# Patient Record
Sex: FEMALE | Race: OTHER | HISPANIC OR LATINO | Employment: UNEMPLOYED | URBAN - METROPOLITAN AREA
[De-identification: names, ages, dates, MRNs, and addresses within clinical notes are randomized per-mention and may not be internally consistent; named-entity substitution may affect disease eponyms.]

---

## 2020-11-27 ENCOUNTER — APPOINTMENT (EMERGENCY)
Dept: CT IMAGING | Facility: HOSPITAL | Age: 55
End: 2020-11-27

## 2020-11-27 ENCOUNTER — HOSPITAL ENCOUNTER (EMERGENCY)
Facility: HOSPITAL | Age: 55
Discharge: HOME | End: 2020-11-27
Attending: EMERGENCY MEDICINE | Admitting: EMERGENCY MEDICINE

## 2020-11-27 VITALS
RESPIRATION RATE: 20 BRPM | TEMPERATURE: 97 F | OXYGEN SATURATION: 100 % | DIASTOLIC BLOOD PRESSURE: 96 MMHG | WEIGHT: 196.2 LBS | HEART RATE: 69 BPM | SYSTOLIC BLOOD PRESSURE: 159 MMHG

## 2020-11-27 DIAGNOSIS — I10 CHRONIC HYPERTENSION: ICD-10-CM

## 2020-11-27 DIAGNOSIS — R51.9 HEADACHE: Primary | ICD-10-CM

## 2020-11-27 LAB
ALBUMIN SERPL BCP-MCNC: 4 G/DL (ref 3–5.2)
ALP SERPL-CCNC: 69 U/L (ref 43–122)
ALT SERPL W P-5'-P-CCNC: 7 U/L (ref 9–52)
ANION GAP SERPL CALCULATED.3IONS-SCNC: 4 MMOL/L (ref 5–14)
AST SERPL W P-5'-P-CCNC: 24 U/L (ref 14–36)
ATRIAL RATE: 72 BPM
BASOPHILS # BLD AUTO: 0.09 THOUSAND/UL (ref 0–0.1)
BASOPHILS NFR MAR MANUAL: 1 % (ref 0–1)
BILIRUB SERPL-MCNC: 0.6 MG/DL
BUN SERPL-MCNC: 9 MG/DL (ref 5–25)
CALCIUM SERPL-MCNC: 9.1 MG/DL (ref 8.4–10.2)
CHLORIDE SERPL-SCNC: 107 MMOL/L (ref 97–108)
CO2 SERPL-SCNC: 25 MMOL/L (ref 22–30)
CREAT SERPL-MCNC: 0.75 MG/DL (ref 0.6–1.2)
EOSINOPHIL # BLD AUTO: 0.09 THOUSAND/UL (ref 0–0.4)
EOSINOPHIL NFR BLD MANUAL: 1 % (ref 0–6)
ERYTHROCYTE [DISTWIDTH] IN BLOOD BY AUTOMATED COUNT: 14 %
GFR SERPL CREATININE-BSD FRML MDRD: 91 ML/MIN/1.73SQ M
GLUCOSE SERPL-MCNC: 136 MG/DL (ref 70–99)
HCT VFR BLD AUTO: 42.4 % (ref 36–46)
HGB BLD-MCNC: 14.6 G/DL (ref 12–16)
LYMPHOCYTES # BLD AUTO: 3.68 THOUSAND/UL (ref 0.5–4)
LYMPHOCYTES # BLD AUTO: 40 % (ref 25–45)
MCH RBC QN AUTO: 31.8 PG (ref 26–34)
MCHC RBC AUTO-ENTMCNC: 34.4 G/DL (ref 31–36)
MCV RBC AUTO: 92 FL (ref 80–100)
MONOCYTES # BLD AUTO: 0.28 THOUSAND/UL (ref 0.2–0.9)
MONOCYTES NFR BLD AUTO: 3 % (ref 1–10)
NEUTS SEG # BLD: 4.69 THOUSAND/UL (ref 1.8–7.8)
NEUTS SEG NFR BLD AUTO: 51 %
P AXIS: 63 DEGREES
PLATELET # BLD AUTO: 246 THOUSANDS/UL (ref 150–450)
PLATELET BLD QL SMEAR: ADEQUATE
PMV BLD AUTO: 9.9 FL (ref 8.9–12.7)
POTASSIUM SERPL-SCNC: 3.8 MMOL/L (ref 3.6–5)
PR INTERVAL: 186 MS
PROT SERPL-MCNC: 7.4 G/DL (ref 5.9–8.4)
QRS AXIS: 33 DEGREES
QRSD INTERVAL: 88 MS
QT INTERVAL: 406 MS
QTC INTERVAL: 444 MS
RBC # BLD AUTO: 4.58 MILLION/UL (ref 4–5.2)
RBC MORPH BLD: NORMAL
SODIUM SERPL-SCNC: 136 MMOL/L (ref 137–147)
T WAVE AXIS: 31 DEGREES
TOTAL CELLS COUNTED SPEC: 100
VARIANT LYMPHS # BLD AUTO: 4 % (ref 0–0)
VENTRICULAR RATE: 72 BPM
WBC # BLD AUTO: 9.2 THOUSAND/UL (ref 4.5–11)

## 2020-11-27 PROCEDURE — 85027 COMPLETE CBC AUTOMATED: CPT | Performed by: EMERGENCY MEDICINE

## 2020-11-27 PROCEDURE — 96360 HYDRATION IV INFUSION INIT: CPT

## 2020-11-27 PROCEDURE — 85007 BL SMEAR W/DIFF WBC COUNT: CPT | Performed by: EMERGENCY MEDICINE

## 2020-11-27 PROCEDURE — 99285 EMERGENCY DEPT VISIT HI MDM: CPT | Performed by: EMERGENCY MEDICINE

## 2020-11-27 PROCEDURE — 93005 ELECTROCARDIOGRAM TRACING: CPT

## 2020-11-27 PROCEDURE — 80053 COMPREHEN METABOLIC PANEL: CPT | Performed by: EMERGENCY MEDICINE

## 2020-11-27 PROCEDURE — G1004 CDSM NDSC: HCPCS

## 2020-11-27 PROCEDURE — 36415 COLL VENOUS BLD VENIPUNCTURE: CPT | Performed by: EMERGENCY MEDICINE

## 2020-11-27 PROCEDURE — 99284 EMERGENCY DEPT VISIT MOD MDM: CPT

## 2020-11-27 PROCEDURE — 93010 ELECTROCARDIOGRAM REPORT: CPT | Performed by: INTERNAL MEDICINE

## 2020-11-27 PROCEDURE — 96361 HYDRATE IV INFUSION ADD-ON: CPT

## 2020-11-27 PROCEDURE — 70450 CT HEAD/BRAIN W/O DYE: CPT

## 2020-11-27 RX ADMIN — SODIUM CHLORIDE 1000 ML: 0.9 INJECTION, SOLUTION INTRAVENOUS at 08:18

## 2021-04-15 ENCOUNTER — HOSPITAL ENCOUNTER (EMERGENCY)
Facility: HOSPITAL | Age: 56
Discharge: HOME | End: 2021-04-15
Attending: EMERGENCY MEDICINE | Admitting: EMERGENCY MEDICINE

## 2021-04-15 VITALS
WEIGHT: 196.21 LBS | OXYGEN SATURATION: 99 % | DIASTOLIC BLOOD PRESSURE: 73 MMHG | TEMPERATURE: 95.6 F | RESPIRATION RATE: 18 BRPM | HEART RATE: 74 BPM | SYSTOLIC BLOOD PRESSURE: 138 MMHG

## 2021-04-15 DIAGNOSIS — F10.929 ALCOHOL INTOXICATION (HCC): Primary | ICD-10-CM

## 2021-04-15 LAB
ALBUMIN SERPL BCP-MCNC: 4.9 G/DL (ref 3–5.2)
ALP SERPL-CCNC: 89 U/L (ref 43–122)
ALT SERPL W P-5'-P-CCNC: 28 U/L
AMPHETAMINES SERPL QL SCN: NEGATIVE
ANION GAP SERPL CALCULATED.3IONS-SCNC: 14 MMOL/L (ref 5–14)
AST SERPL W P-5'-P-CCNC: 54 U/L (ref 14–36)
ATRIAL RATE: 78 BPM
BARBITURATES UR QL: NEGATIVE
BENZODIAZ UR QL: NEGATIVE
BILIRUB SERPL-MCNC: 1.62 MG/DL
BUN SERPL-MCNC: 4 MG/DL (ref 5–25)
CALCIUM SERPL-MCNC: 10.2 MG/DL (ref 8.4–10.2)
CHLORIDE SERPL-SCNC: 110 MMOL/L (ref 97–108)
CO2 SERPL-SCNC: 20 MMOL/L (ref 22–30)
COCAINE UR QL: NEGATIVE
CREAT SERPL-MCNC: 0.59 MG/DL (ref 0.6–1.2)
ERYTHROCYTE [DISTWIDTH] IN BLOOD BY AUTOMATED COUNT: 14.7 %
ETHANOL EXG-MCNC: 0.1 MG/DL
ETHANOL EXG-MCNC: 0.22 MG/DL
FLUAV RNA RESP QL NAA+PROBE: NEGATIVE
FLUBV RNA RESP QL NAA+PROBE: NEGATIVE
GFR SERPL CREATININE-BSD FRML MDRD: 104 ML/MIN/1.73SQ M
GLUCOSE SERPL-MCNC: 199 MG/DL (ref 70–99)
HCT VFR BLD AUTO: 43.5 % (ref 36–46)
HGB BLD-MCNC: 14.6 G/DL (ref 12–16)
LYMPHOCYTES # BLD AUTO: 3.91 THOUSAND/UL (ref 0.5–4)
LYMPHOCYTES # BLD AUTO: 43 % (ref 25–45)
MCH RBC QN AUTO: 31.2 PG (ref 26–34)
MCHC RBC AUTO-ENTMCNC: 33.7 G/DL (ref 31–36)
MCV RBC AUTO: 93 FL (ref 80–100)
METHADONE UR QL: NEGATIVE
MONOCYTES # BLD AUTO: 0.36 THOUSAND/UL (ref 0.2–0.9)
MONOCYTES NFR BLD AUTO: 4 % (ref 1–10)
NEUTS SEG # BLD: 4.82 THOUSAND/UL (ref 1.8–7.8)
NEUTS SEG NFR BLD AUTO: 53 %
OPIATES UR QL SCN: NEGATIVE
OXYCODONE+OXYMORPHONE UR QL SCN: NEGATIVE
P AXIS: 60 DEGREES
PCP UR QL: NEGATIVE
PLATELET # BLD AUTO: 294 THOUSANDS/UL (ref 150–450)
PLATELET BLD QL SMEAR: ADEQUATE
PMV BLD AUTO: 10.2 FL (ref 8.9–12.7)
POTASSIUM SERPL-SCNC: 5.4 MMOL/L (ref 3.6–5)
PR INTERVAL: 200 MS
PROT SERPL-MCNC: 9.1 G/DL (ref 5.9–8.4)
QRS AXIS: 47 DEGREES
QRSD INTERVAL: 94 MS
QT INTERVAL: 412 MS
QTC INTERVAL: 469 MS
RBC # BLD AUTO: 4.68 MILLION/UL (ref 4–5.2)
RBC MORPH BLD: NORMAL
RSV RNA RESP QL NAA+PROBE: NEGATIVE
SARS-COV-2 RNA RESP QL NAA+PROBE: NEGATIVE
SODIUM SERPL-SCNC: 144 MMOL/L (ref 137–147)
T WAVE AXIS: 61 DEGREES
THC UR QL: NEGATIVE
TOTAL CELLS COUNTED SPEC: 100
TSH SERPL DL<=0.05 MIU/L-ACNC: 0.68 UIU/ML (ref 0.47–4.68)
VENTRICULAR RATE: 78 BPM
WBC # BLD AUTO: 9.1 THOUSAND/UL (ref 4.5–11)

## 2021-04-15 PROCEDURE — 85027 COMPLETE CBC AUTOMATED: CPT | Performed by: PHYSICIAN ASSISTANT

## 2021-04-15 PROCEDURE — 0241U HB NFCT DS VIR RESP RNA 4 TRGT: CPT | Performed by: PHYSICIAN ASSISTANT

## 2021-04-15 PROCEDURE — 80307 DRUG TEST PRSMV CHEM ANLYZR: CPT | Performed by: PHYSICIAN ASSISTANT

## 2021-04-15 PROCEDURE — 82075 ASSAY OF BREATH ETHANOL: CPT | Performed by: EMERGENCY MEDICINE

## 2021-04-15 PROCEDURE — 99284 EMERGENCY DEPT VISIT MOD MDM: CPT | Performed by: PHYSICIAN ASSISTANT

## 2021-04-15 PROCEDURE — 84443 ASSAY THYROID STIM HORMONE: CPT | Performed by: PHYSICIAN ASSISTANT

## 2021-04-15 PROCEDURE — 85007 BL SMEAR W/DIFF WBC COUNT: CPT | Performed by: PHYSICIAN ASSISTANT

## 2021-04-15 PROCEDURE — 82075 ASSAY OF BREATH ETHANOL: CPT | Performed by: PHYSICIAN ASSISTANT

## 2021-04-15 PROCEDURE — 80053 COMPREHEN METABOLIC PANEL: CPT | Performed by: PHYSICIAN ASSISTANT

## 2021-04-15 PROCEDURE — 96372 THER/PROPH/DIAG INJ SC/IM: CPT

## 2021-04-15 PROCEDURE — 93005 ELECTROCARDIOGRAM TRACING: CPT

## 2021-04-15 PROCEDURE — 36415 COLL VENOUS BLD VENIPUNCTURE: CPT | Performed by: PHYSICIAN ASSISTANT

## 2021-04-15 PROCEDURE — 93010 ELECTROCARDIOGRAM REPORT: CPT

## 2021-04-15 PROCEDURE — 99285 EMERGENCY DEPT VISIT HI MDM: CPT

## 2021-04-15 RX ORDER — BENZTROPINE MESYLATE 1 MG/ML
2 INJECTION INTRAMUSCULAR; INTRAVENOUS ONCE
Status: COMPLETED | OUTPATIENT
Start: 2021-04-15 | End: 2021-04-15

## 2021-04-15 RX ORDER — LORAZEPAM 2 MG/ML
2 INJECTION INTRAMUSCULAR ONCE
Status: COMPLETED | OUTPATIENT
Start: 2021-04-15 | End: 2021-04-15

## 2021-04-15 RX ORDER — HALOPERIDOL 5 MG/ML
INJECTION INTRAMUSCULAR
Status: COMPLETED
Start: 2021-04-15 | End: 2021-04-15

## 2021-04-15 RX ORDER — HALOPERIDOL 5 MG/ML
5 INJECTION INTRAMUSCULAR ONCE
Status: COMPLETED | OUTPATIENT
Start: 2021-04-15 | End: 2021-04-15

## 2021-04-15 RX ORDER — LORAZEPAM 2 MG/ML
INJECTION INTRAMUSCULAR
Status: COMPLETED
Start: 2021-04-15 | End: 2021-04-15

## 2021-04-15 RX ADMIN — HALOPERIDOL 5 MG: 5 INJECTION INTRAMUSCULAR at 04:42

## 2021-04-15 RX ADMIN — LORAZEPAM 2 MG: 2 INJECTION INTRAMUSCULAR; INTRAVENOUS at 04:42

## 2021-04-15 RX ADMIN — HALOPERIDOL LACTATE 5 MG: 5 INJECTION, SOLUTION INTRAMUSCULAR at 04:42

## 2021-04-15 RX ADMIN — BENZTROPINE MESYLATE 2 MG: 1 INJECTION, SOLUTION INTRAMUSCULAR; INTRAVENOUS at 04:42

## 2021-04-15 RX ADMIN — LORAZEPAM 2 MG: 2 INJECTION INTRAMUSCULAR at 04:42

## 2021-04-15 NOTE — Clinical Note
Gilmer Pizarroer should be transferred out to [facility] and has been medically cleared

## 2021-04-15 NOTE — ED NOTES
Pt received sleeping, respirations are even and unlabored    Continuous 1:1 sitter at bedside       Delilah Davalos RN  04/15/21 4164

## 2021-04-15 NOTE — ED NOTES
Patient sleeping comfortably  No s/s of respiratory distress noted  1 to 1 continued  [facility] ED Tech at bedside         Marium Schilling RN  04/15/21 9795

## 2021-04-15 NOTE — Clinical Note
Sushant Anna was seen and treated in our emergency department on 4/15/2021  Diagnosis:     [place]    She may return on this date: 04/18/2021         If you have any questions or concerns, please don't hesitate to call        Dulce Shepherd DO    ______________________________           _______________          _______________  Hospital Representative                              Date                                Time

## 2021-04-15 NOTE — ED NOTES
PT provided safety tray lunch  PT stated she is not hungry and will eat when she leaves        Melven Dust  04/15/21 1338

## 2021-04-15 NOTE — ED PROVIDER NOTES
History  Chief Complaint   Patient presents with    Psychiatric Evaluation     Patient presents via [employer] for suicidal ideation  Patient's 4 year death anniversary is apparently coming up and patient became very depressed  According to [employer], she was drinking alcohol at home and wants to die  "I called 911 because I wanted you to help me die"  She has no specific plan  Denies any HI or VH/ AH  She herself denies any alcohol use this morning  Also denying any drug use  She does have a history of suicide attempt, stating the most recent episode was in 2013  She denies any other symptoms or complaints  None       Past Medical History:   Diagnosis Date    Diabetes mellitus ([address] )     Hypertension        History reviewed  No pertinent surgical history  History reviewed  No pertinent family history  I have reviewed and agree with the history as documented  E-Cigarette/Vaping    E-Cigarette Use Never User      E-Cigarette/Vaping Substances    Nicotine No     THC No     CBD No     Flavoring No     Other No     Unknown No      Social History     Tobacco Use    Smoking status: Current Every Day Smoker     Packs/day: 0 50     Types: Cigarettes    Smokeless tobacco: Never Used   Substance Use Topics    Alcohol use: Yes     Comment: socially    Drug use: Never       Review of Systems   Constitutional: Negative for chills and fever  HENT: Negative for congestion, ear pain and sore throat  Eyes: Negative for pain  Respiratory: Negative for cough and shortness of breath  Cardiovascular: Negative for chest pain  Gastrointestinal: Negative for abdominal pain, nausea and vomiting  Genitourinary: Negative for dysuria  Musculoskeletal: Negative for back pain  Skin: Negative for rash  Neurological: Negative for dizziness and numbness  Psychiatric/Behavioral: Positive for suicidal ideas  All other systems reviewed and are negative  Physical Exam  Physical Exam  Vitals signs reviewed  Constitutional:       Appearance: Normal appearance  She is well-developed  She is not ill-appearing  Comments: tearful   HENT:      Head: Normocephalic and atraumatic  Right Ear: External ear normal       Left Ear: External ear normal       Nose: Nose normal       Mouth/Throat:      Mouth: Mucous membranes are moist       Pharynx: Oropharynx is clear  Neck:      Musculoskeletal: Normal range of motion and neck supple  Cardiovascular:      Rate and Rhythm: Normal rate and regular rhythm  Heart sounds: Normal heart sounds  Pulmonary:      Effort: Pulmonary effort is normal       Breath sounds: Normal breath sounds  Abdominal:      General: Bowel sounds are normal       Palpations: Abdomen is soft  Tenderness: There is no abdominal tenderness  Musculoskeletal: Normal range of motion  Skin:     General: Skin is warm and dry  Capillary Refill: Capillary refill takes less than 2 seconds  Neurological:      Mental Status: She is alert and oriented to person, place, and time  Psychiatric:         Attention and Perception: Attention and perception normal  She does not perceive auditory or visual hallucinations  Mood and Affect: Mood is depressed  Affect is labile, tearful and inappropriate  Speech: Speech normal          Behavior: Behavior is agitated  Thought Content: Thought content includes suicidal ideation  Thought content does not include homicidal ideation  Thought content does not include homicidal or suicidal plan  Judgment: Judgment is inappropriate           Vital Signs  ED Triage Vitals [04/15/21 0417]   Temperature Pulse Respirations Blood Pressure SpO2   (!) 97 °F (36 1 °C) 96 18 (!) 166/110 98 %      Temp Source Heart Rate Source Patient Position - Orthostatic VS BP Location FiO2 (%)   Tympanic Left;Radial Sitting Left arm --      Pain Score       --           Vitals:    04/15/21 0417   BP: (!) 166/110   Pulse: 96   Patient Position - Orthostatic VS: Sitting         Visual Acuity      ED Medications  Medications   haloperidol lactate (HALDOL) injection 5 mg (5 mg Intramuscular Given 4/15/21 0442)   LORazepam (ATIVAN) injection 2 mg (2 mg Intramuscular Given 4/15/21 0442)   benztropine (COGENTIN) injection 2 mg (2 mg Intramuscular Given 4/15/21 0442)       Diagnostic Studies  Results Reviewed     Procedure Component Value Units Date/Time    Rapid drug screen, urine [[id number]]  (Normal) Collected: 04/15/21 0518    Lab Status: Final result Specimen: Urine, Clean Catch Updated: 04/15/21 0542     Amph/Meth UR Negative     Barbiturate Ur Negative     Benzodiazepine Urine Negative     Cocaine Urine Negative     Methadone Urine Negative     Opiate Urine Negative     PCP Ur Negative     THC Urine Negative     Oxycodone Urine Negative    Narrative:      FOR MEDICAL PURPOSES ONLY  IF CONFIRMATION NEEDED PLEASE CONTACT THE LAB WITHIN 5 DAYS  Drug Screen Cutoff Levels:  AMPHETAMINE/METHAMPHETAMINES  1000 ng/mL  BARBITURATES     200 ng/mL  BENZODIAZEPINES     200 ng/mL  COCAINE      300 ng/mL  METHADONE      300 ng/mL  OPIATES      300 ng/mL  PHENCYCLIDINE     25 ng/mL  THC       50 ng/mL  OXYCODONE      100 ng/mL    TSH [[id number]]  (Normal) Collected: 04/15/21 0434    Lab Status: Final result Specimen: Blood from Arm, Right Updated: 04/15/21 0526     TSH 3RD GENERATON 0 682 uIU/mL     Narrative:      Patients undergoing fluorescein dye angiography may retain small amounts of fluorescein in the body for 48-72 hours post procedure  Samples containing fluorescein can produce falsely depressed TSH values  If the patient had this procedure,a specimen should be resubmitted post fluorescein clearance        Manual Differential (Non Wam) [[id number]] Collected: 04/15/21 0434    Lab Status: Final result Specimen: Blood from Arm, Right Updated: 04/15/21 0511     Segmented % 53 %      Lymphocytes % 43 %      Monocytes % 4 %      Neutrophils Absolute 4 82 Thousand/uL      Lymphocytes Absolute 3 91 Thousand/uL      Monocytes Absolute 0 36 Thousand/uL      Total Counted 100     RBC Morphology Normal     Platelet Estimate Adequate    Comprehensive metabolic panel [[id number]]  (Abnormal) Collected: 04/15/21 0434    Lab Status: Final result Specimen: Blood from Arm, Right Updated: 04/15/21 0455     Sodium 144 mmol/L      Potassium 5 4 mmol/L      Chloride 110 mmol/L      CO2 20 mmol/L      ANION GAP 14 mmol/L      BUN 4 mg/dL      Creatinine 0 59 mg/dL      Glucose 199 mg/dL      Calcium 10 2 mg/dL      AST 54 U/L      ALT 28 U/L      Alkaline Phosphatase 89 U/L      Total Protein 9 1 g/dL      Albumin 4 9 g/dL      Total Bilirubin 1 62 mg/dL      eGFR 104 ml/min/1 73sq m     Narrative:      Gross Hemolysis  National Kidney Disease Foundation guidelines for Chronic Kidney Disease (CKD):     Stage 1 with normal or high GFR (GFR > 90 mL/min/1 73 square meters)    Stage 2 Mild CKD (GFR = 60-89 mL/min/1 73 square meters)    Stage 3A Moderate CKD (GFR = 45-59 mL/min/1 73 square meters)    Stage 3B Moderate CKD (GFR = 30-44 mL/min/1 73 square meters)    Stage 4 Severe CKD (GFR = 15-29 mL/min/1 73 square meters)    Stage 5 End Stage CKD (GFR <15 mL/min/1 73 square meters)  Note: GFR calculation is accurate only with a steady state creatinine    CBC and differential [[id number]]  (Normal) Collected: 04/15/21 0434    Lab Status: Final result Specimen: Blood from Arm, Right Updated: 04/15/21 0454     WBC 9 10 Thousand/uL      RBC 4 68 Million/uL      Hemoglobin 14 6 g/dL      Hematocrit 43 5 %      MCV 93 fL      MCH 31 2 pg      MCHC 33 7 g/dL      RDW 14 7 %      MPV 10 2 fL      Platelets 971 Thousands/uL     POCT alcohol breath test [[id number]]  (Normal) Resulted: 04/15/21 0434    Lab Status: Final result Updated: 04/15/21 0434     EXTBreath Alcohol 0 22    COVID[facility]/B[facility] [[id number]]     Lab Status: No result Specimen: Nares from Nasopharyngeal Swab No orders to display              Procedures  Procedures         ED Course  ED Course as of Apr 15 0652   u Apr 15, 2021   1053   Patient became suddenly very uncooperative trying to elope and uncooperative  She is intoxicated and wants to kill herself  Decision made to restrain patient in 4 points  9200 Patient now out of restraints      53-29-14-27 Care transferred to Dr GALILEO KENNEDY pending sobriety, crisis eval                                              MDM    Disposition  Final diagnoses:   Alcohol intoxication ([address] )     Time reflects when diagnosis was documented in both MDM as applicable and the Disposition within this note     Time User Action Codes Description Comment    4/15/2021  4:53 AM Devi Bowens Add [F10 649] Alcohol intoxication [facility])       ED Disposition     ED Disposition Condition Date/Time Comment    Transfer to [address] [place] [address] Apr 15, 2021  4:53 AM Jerome Valdovinos should be transferred out to [facility] and has been medically cleared  Follow-up Information    None         Patient's Medications    No medications on file     No discharge procedures on file      PDMP Review     None          ED Provider  Electronically Signed by           Rayne Horne PA-C  04/15/21 7929

## 2021-04-15 NOTE — ED NOTES
The patient arrived by Kanu LEUNG after she called 911  Patient was at home alone and began consuming alcohol as she has had trouble sleeping for 2 nights and hoped this would help  She became intoxicated and then depressed and passively suicidal  Police reported she asked them to help her die  She was tearful, irritable and agitated upon arrival  Patient stated it was the 4-year anniversary of the death of her  of 40 years and this was causing her to feel like she would rather be dead  She was seen by ED Crisis once medically cleared and sober  At the time of assessment, she stated she was feeling better and was no longer having suicidal thoughts  She stated she occasionally experiences such thoughts when thinking about her , but they are typically fleeting  She stated her relationship with her daughter is very good and she would never attempt suicide as it would hurt her daughter  The patient stated she had one attempt many years ago, which she recalled was an overdose although she does not recall the circumstances at the time  The patient reported she and her daughter usually talk when either of them feel down or upset, however, her daughter was away last night and she was alone  She stated she does not consume alcohol on a regular basis -perhaps once every 2 weeks  She denies HI  She denies hallucinations and there is not evidence of delusional thoughts  Patient stated she is generally content with her life  Her work atmosphere is enjoyable and her homelife is busy and generally happy  She stated she did not want resources for counseling or psychiatry  She stated she does not have a PCP  She is uninsured and will be provided with PATH information to assist with access to services and supports in the community

## 2021-04-15 NOTE — ED CARE HANDOFF
Emergency Department Sign Out Note        Sign out and transfer of care from [facility]rios  See Separate Emergency Department note  The patient, Cam Aceves, was evaluated by the previous provider for ETOH use, Psych Eval     Workup Completed:  Medical Eval    ED Course / Workup Pending (followup): The patient was seen by previous provider for alcohol intoxication and suicidal ideations  Patient found to be intoxicated with that alcohol level of 220 on arrival   Plan is for repeat alcohol and then patient can be medically cleared  Then to be evaluated by crisis in psychiatry  ED Course as of Apr 15 0910   Thu Apr 15, 2021   5245 Procedure Note: EKG  Date/Time: 04/15/21 7:28 AM   Performed by: [employer] N[employer]  Authorized by: Raynold Nose  ECG interpreted by me, the ED Provider: yes   The EKG demonstrates:  Rate 78  Rhythm sinus  QTc 469  No ST elevations/depressions            Procedures  MDM    Disposition  Final diagnoses:   Alcohol intoxication ([address] )     Time reflects when diagnosis was documented in both MDM as applicable and the Disposition within this note     Time User Action Codes Description Comment    4/15/2021  4:53 AM Wan Bowens Add [F10 929] Alcohol intoxication [facility])       ED Disposition     ED Disposition Condition Date/Time Comment    Transfer to [address] [place] [address] Apr 15, 2021  4:53 AM CarrollFavio Gomes should be transferred out to [address] [place] [address] and has been medically cleared  Follow-up Information    None       Patient's Medications    No medications on file     No discharge procedures on file         ED Provider  Electronically Signed by     Merrill Wheeler DO  04/15/21 8718

## 2021-04-15 NOTE — ED NOTES
Pt sleeping most of the morning, no appetite, pleasant and cooperative    Ready to go home         Braulio Sánchez RN  04/15/21 0847

## 2021-04-15 NOTE — ED NOTES
Patient sleeping comfortably  No s/s of respiratory distress noted  1 to 1 continued  [facility] ED Tech at bedside        Sabrina Gonzales RN  04/15/21 2350

## 2021-04-15 NOTE — ED NOTES
Patient laying comfortably  Current SI due to husbands death anniversary coming up  No plan  Last suicide attempt in 2013  Denies HI/AH/VH  No s/s of respiratory distress noted  1 to 1 initiated  Chen Negro at bedside        Valentino Renee RN  04/15/21 3470

## 2021-04-15 NOTE — RESTRAINT FACE TO FACE
Restraint Face to Face   Ramona Marcianne Lesches 48 y o  female MRN: [id number]  Unit/Bed#: ED 04 Encounter: [id number]      Physical Evaluation  AOx3, nonfocal, protecting airway, refusing to cooperate with staff  Purpose for Restraints/ Seclusion High risk for self harm, High risk for causing significant disruption of treatment environment , High risk for harm to others and high risk for flight  Patient's reaction to the intervention: crying, agitated, aggressive with staff   Offered multiple attempts at  deescalation by myself and other staff members  Patient's medical condition alcohol intoxication  Patient's Behavioral condition SI, depression  Restraints to be Continued

## 2021-05-02 ENCOUNTER — HOSPITAL ENCOUNTER (EMERGENCY)
Facility: HOSPITAL | Age: 56
Discharge: HOME | End: 2021-05-02
Attending: EMERGENCY MEDICINE | Admitting: EMERGENCY MEDICINE

## 2021-05-02 VITALS
OXYGEN SATURATION: 99 % | HEART RATE: 73 BPM | SYSTOLIC BLOOD PRESSURE: 168 MMHG | RESPIRATION RATE: 19 BRPM | TEMPERATURE: 97.6 F | DIASTOLIC BLOOD PRESSURE: 98 MMHG | WEIGHT: 194.45 LBS

## 2021-05-02 DIAGNOSIS — M54.42 ACUTE MIDLINE LOW BACK PAIN WITH LEFT-SIDED SCIATICA: Primary | ICD-10-CM

## 2021-05-02 PROCEDURE — 96372 THER/PROPH/DIAG INJ SC/IM: CPT

## 2021-05-02 PROCEDURE — 99283 EMERGENCY DEPT VISIT LOW MDM: CPT

## 2021-05-02 PROCEDURE — 99284 EMERGENCY DEPT VISIT MOD MDM: CPT | Performed by: EMERGENCY MEDICINE

## 2021-05-02 RX ORDER — METHYLPREDNISOLONE 4 MG/1
TABLET ORAL
Qty: 21 TABLET | Refills: 0 | Status: SHIPPED | OUTPATIENT
Start: 2021-05-02

## 2021-05-02 RX ORDER — KETOROLAC TROMETHAMINE 30 MG/ML
30 INJECTION, SOLUTION INTRAMUSCULAR; INTRAVENOUS ONCE
Status: COMPLETED | OUTPATIENT
Start: 2021-05-02 | End: 2021-05-02

## 2021-05-02 RX ORDER — CYCLOBENZAPRINE HCL 10 MG
10 TABLET ORAL 2 TIMES DAILY PRN
Qty: 20 TABLET | Refills: 0 | Status: SHIPPED | OUTPATIENT
Start: 2021-05-02

## 2021-05-02 RX ORDER — DIAZEPAM 5 MG/1
5 TABLET ORAL ONCE
Status: COMPLETED | OUTPATIENT
Start: 2021-05-02 | End: 2021-05-02

## 2021-05-02 RX ORDER — NAPROXEN 500 MG/1
500 TABLET ORAL 2 TIMES DAILY WITH MEALS
Qty: 30 TABLET | Refills: 0 | Status: SHIPPED | OUTPATIENT
Start: 2021-05-02

## 2021-05-02 RX ADMIN — KETOROLAC TROMETHAMINE 30 MG: 30 INJECTION, SOLUTION INTRAMUSCULAR; INTRAVENOUS at 10:01

## 2021-05-02 RX ADMIN — DIAZEPAM 5 MG: 5 TABLET ORAL at 10:01

## 2021-05-02 NOTE — ED PROVIDER NOTES
History  Chief Complaint   Patient presents with    Back Pain     Lower back pain which radiates down LLE  Since friday  47 yo female with a history of DM and HTN presents to the ED complaining of low back pain since Friday  The patient thinks she injured herself while lifting heavy boxes at work on Friday  She reports a constant "aching" pain in her lumbar back that occasionally radiates down her posterior left leg  No numbness or weakness  No incontinence/retention  She denies fevers/chills  No direct trauma to the back or falls  No history of IVDA  No dysuria/hematuria  No other specific complaints  None       Past Medical History:   Diagnosis Date    Diabetes mellitus ([facility] [address] [address] )     Hypertension        History reviewed  No pertinent surgical history  History reviewed  No pertinent family history  I have reviewed and agree with the history as documented  E-Cigarette/Vaping    E-Cigarette Use Never User      E-Cigarette/Vaping Substances    Nicotine No     THC No     CBD No     Flavoring No     Other No     Unknown No      Social History     Tobacco Use    Smoking status: Current Every Day Smoker     Packs/day: 0 50     Types: Cigarettes    Smokeless tobacco: Never Used   Substance Use Topics    Alcohol use: Yes     Comment: socially    Drug use: Never       Review of Systems   Constitutional: Negative for chills and fever  HENT: Negative for sore throat  Eyes: Negative for visual disturbance  Respiratory: Negative for shortness of breath  Cardiovascular: Negative for chest pain  Gastrointestinal: Negative for abdominal pain, diarrhea and vomiting  Endocrine: Negative for cold intolerance and heat intolerance  Genitourinary: Negative for difficulty urinating, dysuria and frequency  Musculoskeletal: Positive for back pain  Negative for gait problem  Skin: Negative for rash  Allergic/Immunologic: Negative for immunocompromised state     Neurological: Negative for weakness and numbness  Hematological: Negative for adenopathy  Psychiatric/Behavioral: Negative for self-injury  Physical Exam  Physical Exam  Constitutional:       General: She is not in acute distress  Appearance: She is well-developed  HENT:      Head: Normocephalic and atraumatic  Eyes:      Pupils: Pupils are equal, round, and reactive to light  Neck:      Musculoskeletal: Normal range of motion and neck supple  Cardiovascular:      Rate and Rhythm: Normal rate and regular rhythm  Pulmonary:      Effort: Pulmonary effort is normal       Breath sounds: Normal breath sounds  Abdominal:      General: There is no distension  Palpations: Abdomen is soft  Tenderness: There is no abdominal tenderness  There is no right CVA tenderness or left CVA tenderness  Musculoskeletal:      Lumbar back: She exhibits decreased range of motion, tenderness, pain and spasm  She exhibits no bony tenderness, no swelling, no edema and no deformity  Skin:     General: Skin is warm and dry  Neurological:      Mental Status: She is alert and oriented to person, place, and time  Cranial Nerves: Cranial nerves are intact  Sensory: Sensation is intact  Motor: Motor function is intact  Coordination: Coordination is intact  Gait: Gait is intact  Deep Tendon Reflexes: Reflexes are normal and symmetric           Vital Signs  ED Triage Vitals [05/02/21 0948]   Temperature Pulse Respirations Blood Pressure SpO2   97 6 °F (36 4 °C) 73 19 168/98 99 %      Temp Source Heart Rate Source Patient Position - Orthostatic VS BP Location FiO2 (%)   Tympanic Monitor Lying Left arm --      Pain Score       Worst Possible Pain           Vitals:    05/02/21 0948   BP: 168/98   Pulse: 73   Patient Position - Orthostatic VS: Lying         Visual Acuity      ED Medications  Medications   diazepam (VALIUM) tablet 5 mg (5 mg Oral Given 5/2/21 1001)   ketorolac (TORADOL) injection 30 mg (30 mg Intramuscular Given 5/2/21 1001)       Diagnostic Studies  Results Reviewed     None                 No orders to display              Procedures  Procedures         ED Course                             SBIRT 22yo+      Most Recent Value   SBIRT (23 yo +)   In order to provide better care to our patients, we are screening all of our patients for alcohol and drug use  Would it be okay to ask you these screening questions? No Filed at: 05/02/2021 1014                    [facility]  Number of Diagnoses or Management Options  Acute midline low back pain with left-sided sciatica:   Diagnosis management comments: The patient is uncomfortable appearing with palpable paraspinal muscle spasm in the lumbar back  Neurologic examination in normal  Very low clinical suspicion for an acute cord injury  Plan for pain control with NSAIDs, a muscle relaxer prn, and a course of oral steroids  The patient was [facility] to both [facility] and [facility]  Plan for follow up with them later this week  She is agreeable to this plan  Strict return precautions provided  Patient Progress  Patient progress: stable      Disposition  Final diagnoses:   Acute midline low back pain with left-sided sciatica     Time reflects when diagnosis was documented in both MDM as applicable and the Disposition within this note     Time User Action Codes Description Comment    5/2/2021  9:55 AM Fernando Jimenez Add [M54 42] Acute midline low back pain with left-sided sciatica       ED Disposition     ED Disposition Condition Date/Time Comment    Discharge Stable Sun May 2, 2021  9:55 AM Renetta Matthews discharge to home/self care              Follow-up Information    None         Discharge Medication List as of 5/2/2021  9:57 AM      START taking these medications    Details   cyclobenzaprine (FLEXERIL) 10 mg tablet Take 1 tablet (10 mg total) by mouth 2 (two) times a day as needed for muscle spasms, Starting Sun 5/2/2021, Print methylPREDNISolone 4 MG tablet therapy pack Use as directed on package, Print      naproxen (NAPROSYN) 500 mg tablet Take 1 tablet (500 mg total) by mouth 2 (two) times a day with meals, Starting Sun 5/2/2021, Print               PDMP Review     None          ED Provider  Electronically Signed by           Rayo Farrell MD  05/02/21 7393

## 2021-05-02 NOTE — Clinical Note
Franki Rodriguez was seen and treated in our emergency department on 5/2/2021  Diagnosis:     [place]  may return to work on return date  She may return on this date: 05/04/2021         If you have any questions or concerns, please don't hesitate to call        Christiane Figueredo MD    ______________________________           _______________          _______________  Hospital Representative                              Date                                Time

## 2021-05-04 ENCOUNTER — TELEPHONE (OUTPATIENT)
Dept: PHYSICAL THERAPY | Facility: OTHER | Age: 56
End: 2021-05-04

## 2021-05-04 NOTE — TELEPHONE ENCOUNTER
Call placed to the patient per [facility] referral     Marcial Ennis states customer can not be reached at this time please try again later  This is the 1st attempt to reach the patient  Will defer per protocol

## 2021-05-07 ENCOUNTER — TELEPHONE (OUTPATIENT)
Dept: PHYSICAL THERAPY | Facility: OTHER | Age: 56
End: 2021-05-07

## 2021-05-07 NOTE — TELEPHONE ENCOUNTER
Call placed to the patient per [facility] referral     Voice message left for patient to call back  Phone number and hours of business provided  This the 2nd attempt to reach the patient  Will defer per protocol

## 2021-05-17 ENCOUNTER — TELEPHONE (OUTPATIENT)
Dept: PHYSICAL THERAPY | Facility: OTHER | Age: 56
End: 2021-05-17

## 2021-05-17 NOTE — TELEPHONE ENCOUNTER
Call placed to the patient per [facility] referral     Voice message left for patient to call back  Phone number and hours of business provided  This is the 3rd attempt to reach the patient  Referral closed

## 2021-07-25 ENCOUNTER — HOSPITAL ENCOUNTER (OUTPATIENT)
Dept: MRI IMAGING | Facility: HOSPITAL | Age: 56
Discharge: HOME | End: 2021-07-25

## 2021-07-25 DIAGNOSIS — M51.26 HNP (HERNIATED NUCLEUS PULPOSUS), LUMBAR: ICD-10-CM

## 2021-07-25 PROCEDURE — 72148 MRI LUMBAR SPINE W/O DYE: CPT

## 2022-09-26 ENCOUNTER — OFFICE VISIT (OUTPATIENT)
Dept: FAMILY MEDICINE CLINIC | Facility: CLINIC | Age: 57
End: 2022-09-26

## 2022-09-26 VITALS
OXYGEN SATURATION: 96 % | HEIGHT: 68 IN | RESPIRATION RATE: 16 BRPM | TEMPERATURE: 96.8 F | DIASTOLIC BLOOD PRESSURE: 100 MMHG | SYSTOLIC BLOOD PRESSURE: 196 MMHG | BODY MASS INDEX: 28.79 KG/M2 | WEIGHT: 190 LBS | HEART RATE: 84 BPM

## 2022-09-26 DIAGNOSIS — M79.671 RIGHT FOOT PAIN: ICD-10-CM

## 2022-09-26 DIAGNOSIS — E11.9 TYPE 2 DIABETES MELLITUS WITHOUT COMPLICATION, WITHOUT LONG-TERM CURRENT USE OF INSULIN (HCC): Primary | ICD-10-CM

## 2022-09-26 DIAGNOSIS — I25.10 CORONARY ARTERY DISEASE INVOLVING NATIVE HEART, UNSPECIFIED VESSEL OR LESION TYPE, UNSPECIFIED WHETHER ANGINA PRESENT: ICD-10-CM

## 2022-09-26 DIAGNOSIS — Z98.890 H/O FOOT SURGERY: ICD-10-CM

## 2022-09-26 DIAGNOSIS — K21.9 GASTROESOPHAGEAL REFLUX DISEASE WITHOUT ESOPHAGITIS: ICD-10-CM

## 2022-09-26 DIAGNOSIS — I10 PRIMARY HYPERTENSION: ICD-10-CM

## 2022-09-26 LAB — SL AMB POCT HEMOGLOBIN AIC: 6.8 (ref ?–6.5)

## 2022-09-26 PROCEDURE — 83036 HEMOGLOBIN GLYCOSYLATED A1C: CPT | Performed by: FAMILY MEDICINE

## 2022-09-26 PROCEDURE — 99214 OFFICE O/P EST MOD 30 MIN: CPT | Performed by: FAMILY MEDICINE

## 2022-09-26 RX ORDER — ATORVASTATIN CALCIUM 20 MG/1
20 TABLET, FILM COATED ORAL DAILY
Qty: 30 TABLET | Refills: 3 | Status: SHIPPED | OUTPATIENT
Start: 2022-09-26

## 2022-09-26 RX ORDER — CARVEDILOL 25 MG/1
25 TABLET ORAL
COMMUNITY

## 2022-09-26 RX ORDER — LISINOPRIL 20 MG/1
20 TABLET ORAL DAILY
Qty: 30 TABLET | Refills: 3 | Status: SHIPPED | OUTPATIENT
Start: 2022-09-26

## 2022-09-26 RX ORDER — OMEPRAZOLE 40 MG/1
40 CAPSULE, DELAYED RELEASE ORAL DAILY
Qty: 30 CAPSULE | Refills: 2 | Status: SHIPPED | OUTPATIENT
Start: 2022-09-26
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
## 2022-09-27 PROBLEM — I10 PRIMARY HYPERTENSION: Status: ACTIVE | Noted: 2022-09-27

## 2022-09-27 PROBLEM — E11.9 TYPE 2 DIABETES MELLITUS WITHOUT COMPLICATION, WITHOUT LONG-TERM CURRENT USE OF INSULIN (HCC): Status: ACTIVE | Noted: 2022-09-27

## 2022-09-27 PROBLEM — I25.10 CORONARY ARTERY DISEASE INVOLVING NATIVE HEART: Status: ACTIVE | Noted: 2022-09-27

## 2022-09-27 PROBLEM — K21.9 GASTROESOPHAGEAL REFLUX DISEASE WITHOUT ESOPHAGITIS: Status: ACTIVE | Noted: 2022-09-27

## 2022-09-27 PROBLEM — Z98.890 H/O FOOT SURGERY: Status: ACTIVE | Noted: 2022-09-27

## 2022-09-27 PROBLEM — M79.671 RIGHT FOOT PAIN: Status: ACTIVE | Noted: 2022-09-27

## 2022-09-27 NOTE — ASSESSMENT & PLAN NOTE
BP Readings from Last 3 Encounters:   09/26/22 (!) 196/100   05/02/21 168/98   04/15/21 138/73     Uncontrolled Hypertension  Did not take her medication today  Increase Carvedilol to 25 mg bid  Start Lisinopril 20 mg daily  Lab work sent  Monitor BP at home  Recheck in 4 weeks

## 2022-09-27 NOTE — ASSESSMENT & PLAN NOTE
Lab Results   Component Value Date    HGBA1C 6 8 (A) 09/26/2022       - Current medications:  Metformin 1000 mg daily  - Keep log of blood glucose readings  - Encouraged: Diabetic Diet, Regular exercise, Weight loss   - +Htn: starting an ACE today  - +Hld: starting a statin today  - Ophthalmology: pending  - DM Foot exam: pending  - Dentist: pending

## 2022-11-21 DIAGNOSIS — I10 PRIMARY HYPERTENSION: Primary | ICD-10-CM
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
## 2022-11-22 RX ORDER — CARVEDILOL 25 MG/1
25 TABLET ORAL 2 TIMES DAILY WITH MEALS
Qty: 60 TABLET | Refills: 2 | Status: SHIPPED | OUTPATIENT
Start: 2022-11-22

## 2023-01-24 ENCOUNTER — OFFICE VISIT (OUTPATIENT)
Dept: OBGYN CLINIC | Facility: CLINIC | Age: 58
End: 2023-01-24

## 2023-01-24 VITALS
WEIGHT: 188.6 LBS | DIASTOLIC BLOOD PRESSURE: 75 MMHG | HEART RATE: 82 BPM | SYSTOLIC BLOOD PRESSURE: 151 MMHG | BODY MASS INDEX: 28.68 KG/M2

## 2023-01-24 DIAGNOSIS — Z12.31 ENCOUNTER FOR SCREENING MAMMOGRAM FOR MALIGNANT NEOPLASM OF BREAST: ICD-10-CM

## 2023-01-24 DIAGNOSIS — Z01.419 ENCOUNTER FOR GYNECOLOGICAL EXAMINATION WITHOUT ABNORMAL FINDING: Primary | ICD-10-CM

## 2023-01-24 DIAGNOSIS — Z12.4 SCREENING FOR CERVICAL CANCER: ICD-10-CM

## 2023-01-24 DIAGNOSIS — Z12.39 ENCOUNTER FOR BREAST CANCER SCREENING USING NON-MAMMOGRAM MODALITY: ICD-10-CM

## 2023-01-24 DIAGNOSIS — Z12.11 SCREENING FOR COLON CANCER: ICD-10-CM

## 2023-01-24 NOTE — PATIENT INSTRUCTIONS
Alejandro por guerrero confianza en nuestro equipo  Areaa Overall y agradecemos arlene comentarios  Si recibe gabriela encuesta nuestra, tómese unos momentos para informarnos cómo estamos  Sinceramente,  [place] Friends, CRNP       Fumar cigarrillos y guerrero lemuel     Los riesgos para guerrero lemuel si usted fuma:  La nicotina y otros químicos que se encuentran en el tabaco dañan todas las células del cuerpo  Aunque fume poco o sea un fumador social, el riesgo de [address] , enfermedad del corazón y enfermedad de los pulmones [place]  Si usted está embarazada o tiene diabetes, el fumar aumenta guerrero riesgo de sufrir complicaciones  Los beneficios para guerrero lemuel si usted nadia de fumar:   Disminuyen los síntomas respiratorios cisco tos, sibilancias y dificultad para respirar  Usted reduce el riesgo de cáncer de pulmón, bucal, de la garganta, riñón, vejiga, páncreas, estómago y cervix  Si usted ya tiene cáncer, al no fumar aumenta los beneficios de la quimioterapia  También reduce guerrero riesgo de que el cáncer regrese o que desarrolle un karen cáncer  Usted reduce guerrero riesgo de gabriela enfermedad cardíaca, coágulos sanguíneos, ataque cardíaco y un derrame cerebral      Usted reduce guerrero riesgo de presentar infecciones y enfermedades en los pulmones, cisco la neumonía, el asma, la bronquitis crónica y [place]  Guerrero circulación mejora  Al permitir que [employer] suministro de oxígeno a guerrero cuerpo  Si usted tiene diabetes, disminuye guerrero riesgo de complicaciones, cisco enfermedades del [place], de las arterias y de los ojos  Usted también disminuye guerrero riesgo de daño a los nervios  El daño a los nervios puede conllevar a gabriela amputación, jeaneth vista y ceguera  La capacidad de guerrero cuerpo para sanar y combatir infecciones mejora  Los beneficios para la lemuel de las otras personas si usted nadia de fumar:  El tabaco es perjudicial para los no fumadores que respiran el humo de forma pasiva   Las siguientes son maneras en que puede mejorar la lemuel de las personas que lo rodean cuando usted nadia de fumar:    Usted disminuye el riesgo de cáncer en los pulmones y de enfermedades del corazón en los adultos que no fuman  Si usted [employer], disminuye guerrero riesgo de sufrir un aborto espontáneo, un parto antes de Jose C, un bebé de bajo peso al nacer y de muerte fetal  También disminuye el riesgo del bebé de sufrir el síndrome de muerte súbita del lactante (SMIS, o muerte en la cuna), obesidad, atrasos en el desarrollo y problemas neuroconductuales, cisco el trastorno por déficit de atención e hiperactividad [facility]  Si usted tiene niños, disminuye el riesgo de que arlene niños contraigan infecciones, resfriados, neumonía, bronquitis y asma  Cómo dejar de fumar     Usted mejorará guerrero lemuel y [address] a guerrero alrededor  si usted nadia de fumar  Guerrero riesgo de enfermedades cardíacas y pulmonares, cáncer, derrames cerebrales, ataques cardíacos y problemas de vista también [address]  Usted se puede beneficiar al dejar de fumar sin importar por cuanto tiempo haya fumado  PREPARESE para dejar de fumar  La nicotina es gabriela droga muy adictiva que se encuentra en los cigarrillos  Los síntomas de abstinencia pueden suceder cuando usted nadia de fumar y, por lo tanto, dificultan el [place]  Estos síntomas incluyen ansiedad, depresión, irritabilidad, dificultad para dormir y aumento del apetito  Usted puede aumentar arlene probabilidad de éxito para dejar de fumar si se PREPARA con anticipación  Fije gabriela fecha para dejar de fumar  Elegir gabriela fecha dentro de las próximas 2 semanas  No escoja un día que usted piensa que puede ser estresante u ocupado  Anote el día, o delbert un círculo en el calendario  Infórmele a arlene amigos y tere que usted planea dejar de fumar  Explique que usted podría sufrir de síntomas de abstinencia cuando trata de dejar de fumar  Pídales que lo apoyen   Es posible que ellos lo animen y le ayuden a reducir el estrés para que sea más fácil dejarlo  Sita gabriela lista de arlene razones para dejar de fumar  Ponga la lista en algún sitio donde lo johnny cada día, cisco el refrigerador  Puede mirar la lista cuando tenga un antojo  Elimine todos los productos de tabaco y nicotina que tenga en guerrero casa, dane y lugar de [place]  También, elimine cualquier otra cosa que lo tiente a usted para fumar, cisco encendedores, cerillos o kaden  Limpie guerrero coche, casa y lugares de trabajo que [employer] a humo  El [place] a humo puede desencadenar un deseo  Identifique los desencadenantes que jalen ganas de fumar  [place] puede incluir actividades, sentimientos o personas  También anote 1 manera en que puede tratar cada toi de arlene factores desencadenantes  Por ejemplo, si quiere fumar tan pronto cisco se despierta, planee otra [address] [id number], cisco el ejercicio  Sita un plan de cómo dejará de fumar  Conozca las herramientas que pueden ayudar a dejar, cisco terapia de reemplazo de [place], [place] y asesoría  Elija al menos 2 opciones para ayudarse a dejar de fumar  Opciones que le pueden ayudar a dejar de fumar:     La terapia  de un médico calificado le puede proveer con apoyo e ideas para dejar de fumar  También le enseñará a controlar arlene síntomas de abstinencia y antojos  Usted podría recibir consejería de un consejero, terapia en mahendra, o por medio de gabriela terapia telefónica conocida cisco quit line (la línea para dejar de fumar)  La terapia de reemplazo de nicotina (NRT, por arlene siglas en inglés)  cisco los parches de nicotina, la goma de mascar o las pastillas podrían ayudar a reducir arlene antojos de nicotina  Usted puede [employer] parches y otros artículos sin necesidad de receta médica  No use cigarrillos electrónicos o tabaco sin humo en vez de cigarrillos o para tratar de dejar de fumar  Todos estos aún contienen nicotina      Los medicamentos recetados  [employer] atomizadores nasales o los inhaladores de nicotina podrían ayudar a reducir arlene síntomas de abstinencia  Otros medicamentos también podrían usarse para reducir arlene ganas de fumar  Pregúntele a guerrero médico sobre [employer]  Es probable que usted necesite empezar a neymar ciertos medicamentos 2 semanas antes de guerrero fecha programada para dejar de fumar para que los mismos funcionen eduardo  La hipnosis  es gabriela práctica que ayuda a guiarlo a través de pensamientos y sentimientos  La hipnosis puede ayudar a disminuir arlene antojos y que esté más dispuesto a dejar de fumar  La terapia de la acupuntura  [place] agujas muy delgadas para equilibrar los rahman de energía en el cuerpo  Se kassie que esto ayuda a disminuir los antojos y los síntomas de abstinencia de la nicotina  Los grupos de [facility] permiten hablar con otros que están tratando de dejar de fumar o ya mcdaniel dejado  Puede ser útil hablar con otros sobre cómo dejar el hábitp  Controle arlene antojos:     Evite situaciones, personas y lugares que lo tientan a usted a fumar  Vaya a lugares donde no se permite fumar, cisco bibliotecas o restaurantes  Sepa cuáles son las cosas que lo [place] y trate de evitarlas  Mantenga arlene jorge ocupadas  Sostenga en guerrero mano gabriela pelota para el estrés o gabriela pluma  Póngase un caramelo o palillo de dientes en la boca  Tenga siempre disponible piruletas, chicles sin azúcar o palillos  No consuma alcohol ni cafeína  Estas bebidas podrían tentarlo a fumar  Tara líquidos sanos cisco agua o jugo en vez  Dese gabriela recompensa cuando logra resistir arlene antojos  Las recompensas lo motivarán y ayudarán a estar positivo  Sita gabriela actividad que lo distraiga de guerrero antojo  Por ejemplo salir a caminar, hacer ejercicio o [place]  Prevenga el aumento de peso después de dejar de fumar:  Usted podría subir unas cuantas libras después de dejar de fumar  Es más saludable para usted subir un poco de peso que continuar fumando   Lo siguiente puede ayudarlo a prevenir el aumento de peso:    Consuma alimentos saludables  Las frutas, verduras, panes integrales, productos lácteos bajos en grasa, frijoles, jeff magras y pescado son [place] saludables para el corazón  [address] meriendas saludables, cisco yogur bajo en [place], en reza de sentirse hambriento entre comidas  [address] agua antes, faustina y [place] comidas  [place] hará que guerrero estómago se sienta lleno y ayudará a evitar que coma en exceso  Pregunte a guerrero médico sobre la cantidad de líquido que necesita neymar todos los días y cuáles le recomienda  Ejercicio  Salga a caminar o practique algún tipo de ejercicio cada día  Pregúntele a guerrero médico cuáles ejercicios son correctos para usted  El ejercicio podría ayudarle a reducir arlene antojos y reducir el estrés

## 2023-01-24 NOTE — PROGRESS NOTES
Hans Tran is a 65 y o  female who presents today for annual GYN exam   Her last pap smear was performed 7 years ago and result was WNL per patient  She reports no history of abnormal pap smears in her past   Her last mammogram was performed 7 years ago and result was WNL per patient  She has never had colon cancer screening performed  She reports menses as absent since   Her general medical history has been reviewed and she reports it as follows:    Past Medical History:   Diagnosis Date   • Coronary artery disease    • Depression    • Diabetes mellitus ([address] )    • GERD (gastroesophageal reflux disease)    • Hyperlipidemia    • Hypertension      Past Surgical History:   Procedure Laterality Date   • ABDOMINOPLASTY     • AUGMENTATION BREAST Bilateral    • BREAST BIOPSY Left 2016    benign   • FOOT FRACTURE SURGERY Right    • LIPOSUCTION TRUNK     • TUBAL LIGATION       OB History        5    Para   1    Term   1       0    AB   4    Living   1       SAB   3    IAB   0    Ectopic   1    Multiple   0    Live Births   1               Social History     Tobacco Use   • Smoking status: Every Day     Packs/day: 0 25     Types: Cigarettes   • Smokeless tobacco: Never   Vaping Use   • Vaping Use: Never used   Substance Use Topics   • Alcohol use: Yes     Comment: couple times/year   • Drug use: Never     Social History     Substance and Sexual Activity   Sexual Activity Not Currently   • Partners: Male   • Birth control/protection: Female Sterilization, Post-menopausal     Cancer-related family history includes Cancer in her brother  There is no history of Breast cancer, Colon cancer, or Ovarian cancer      Current Outpatient Medications   Medication Instructions   • carvedilol (COREG) 25 mg, Oral, 2 times daily with meals   • lisinopril (ZESTRIL) 20 mg, Oral, Daily   • metFORMIN (GLUCOPHAGE) 1,000 mg, Oral, Daily with breakfast   • omeprazole (PRILOSEC) 40 mg, Oral, Daily       Review of Systems:  Review of Systems   Constitutional: Negative  Gastrointestinal: Negative  Genitourinary: Negative for difficulty urinating, pelvic pain, vaginal bleeding and vaginal discharge  L breast pulling/burning sensation   Skin: Negative  Physical Exam:  /75   Pulse 82   Wt 85 5 kg (188 lb 9 6 oz)   BMI 28 68 kg/m²   Physical Exam  Constitutional:       General: She is not in acute distress  Appearance: She is well-developed  Genitourinary:      Vulva normal       No lesions in the vagina  Right Adnexa: not tender and no mass present  Left Adnexa: not tender and no mass present  No cervical motion tenderness or lesion  Uterus is not tender  Breasts:     Right: No mass, nipple discharge, skin change or tenderness  Left: No mass, nipple discharge, skin change or tenderness  Neck:      Thyroid: No thyromegaly  Cardiovascular:      Rate and Rhythm: Normal rate and regular rhythm  Pulmonary:      Effort: Pulmonary effort is normal    Abdominal:      Palpations: Abdomen is soft  Tenderness: There is no abdominal tenderness  Musculoskeletal:      Cervical back: Neck supple  Neurological:      Mental Status: She is alert and oriented to person, place, and time  Skin:     General: Skin is warm and dry  Vitals reviewed  Assessment/Plan:   1  Normal well-woman GYN exam   2  Cervical cancer screening:  Normal cervical exam   Pap smear done with HPV co-testing  3  STD screening:  Patient declines  4  Breast cancer screening:  Normal breast exam   Order placed for bilateral screening mammogram   Reviewed breast self-awareness  5  Colon cancer screening:  Order placed for consultation with Gastroenterology for consultation to discuss screening  6  Depression Screening: Patient's depression screening was assessed with a PHQ-2 score of 0  Their PHQ-9 score was 0   Clinically patient does not have depression  No treatment is required  7  BMI Counseling: Body mass index is 28 68 kg/m²  No intervention indicated  8  Tobacco Cessation Counseling: Tobacco cessation counseling and education was provided  The patient is sincerely urged to quit consumption of tobacco  She is not ready to quit tobacco  The numerous health risks of tobacco consumption were discussed  If she decides to quit, there are a number of helpful adjunctive aids, and she can see me to discuss nicotine replacement therapy, chantix, or bupropion anytime in the future     9  Return to office in 1 year for annual GYN exam

## 2023-01-25 LAB
HPV HR 12 DNA CVX QL NAA+PROBE: NEGATIVE
HPV16 DNA CVX QL NAA+PROBE: NEGATIVE
HPV18 DNA CVX QL NAA+PROBE: POSITIVE

## 2023-01-31 ENCOUNTER — TELEPHONE (OUTPATIENT)
Dept: OBGYN CLINIC | Facility: CLINIC | Age: 58
End: 2023-01-31

## 2023-01-31 LAB
LAB AP GYN PRIMARY INTERPRETATION: NORMAL
Lab: NORMAL

## 2023-02-01 ENCOUNTER — TELEPHONE (OUTPATIENT)
Dept: OBGYN CLINIC | Facility: CLINIC | Age: 58
End: 2023-02-01

## 2023-02-01 NOTE — TELEPHONE ENCOUNTER
Please contact patient in Mongolian and advise her of normal pap but + [id number]  For that reason, she should have a colposcopy performed  Please assist her with scheduling colposcopy

## 2023-02-27 ENCOUNTER — OFFICE VISIT (OUTPATIENT)
Dept: PODIATRY | Facility: CLINIC | Age: 58
End: 2023-02-27

## 2023-02-27 VITALS
WEIGHT: 188 LBS | HEIGHT: 68 IN | BODY MASS INDEX: 28.49 KG/M2 | DIASTOLIC BLOOD PRESSURE: 80 MMHG | SYSTOLIC BLOOD PRESSURE: 120 MMHG | HEART RATE: 75 BPM

## 2023-02-27 DIAGNOSIS — M25.571 CHRONIC PAIN OF RIGHT ANKLE: ICD-10-CM

## 2023-02-27 DIAGNOSIS — E11.9 TYPE 2 DIABETES MELLITUS WITHOUT COMPLICATION, WITHOUT LONG-TERM CURRENT USE OF INSULIN (HCC): ICD-10-CM

## 2023-02-27 DIAGNOSIS — G89.29 CHRONIC HEEL PAIN, RIGHT: ICD-10-CM

## 2023-02-27 DIAGNOSIS — M79.671 CHRONIC HEEL PAIN, RIGHT: ICD-10-CM

## 2023-02-27 DIAGNOSIS — G89.29 CHRONIC PAIN OF RIGHT ANKLE: ICD-10-CM

## 2023-02-27 DIAGNOSIS — M76.71 PERONEAL TENDONITIS OF RIGHT LOWER EXTREMITY: Primary | ICD-10-CM

## 2023-02-27 DIAGNOSIS — I10 PRIMARY HYPERTENSION: ICD-10-CM

## 2023-02-27 RX ORDER — LISINOPRIL 20 MG/1
20 TABLET ORAL DAILY
Qty: 30 TABLET | Refills: 0 | Status: SHIPPED | OUTPATIENT
Start: 2023-02-27

## 2023-02-27 RX ORDER — MELOXICAM 15 MG/1
15 TABLET ORAL DAILY
Qty: 30 TABLET | Refills: 1 | Status: SHIPPED | OUTPATIENT
Start: 2023-02-27
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
## 2023-02-27 NOTE — PATIENT INSTRUCTIONS
Ejercicios para el tobillo   CUIDADO AMBULATORIO:   Lo que necesita saber acerca de los ejercicios para el tobillo: Los ejercicios para el tobillo ayudan a fortalecer guerrero tobillo y a mejorar guerrero función después de gabriela Alexander Jesus  Estos son [place] H[place] básicos  Pregúntele a ugerrero médico si usted necesita acudir con un fisioterapeuta para que le indique ejercicios más avanzado  [place]autas generales para los ejercicios de tobillo:  Realice estos ejercicios de 3 a 5 días a la semana o según le indicó guerrero médico  Pregunte si debería hacer los ejercicios con ambos tobillos  Realice los ejercicios en el orden que le recomiende guerrero médico para prevenir la inflamación, el dolor crónico y volverse a lesionar  Empiece con los ejercicios del rango de [place]  Luego pase a los ejercicios de fortalecimiento y finalmente a los de ejercicios de estabilidad  Entre en calor antes de hacer los ejercicios para el tobillo  Camine o monte en gabriela bicicleta estática faustina 5 a 10 minutos para preparase a  guerrero tobillo  Deténgase si siente dolor  Es normal sentir alguna molestia al principio christopher no debería sentir dolor  Informe a guerrero médico o fisioterapeuta si tiene dolor mientras hace ejercicio  Practicar los ejercicios con regularidad ayudará a disminuir guerrero incomodidad con el paso del [place] C  Cómo realizar los ejercicios de rango de movimiento de forma carvajal: Empiece con los ejercicios del rango de movimiento para mejorar la flexibilidad  Pregúntele a guerrero médico cuándo puede comenzar a realizar los ejercicios de fortalecimiento  El abecedario con el tobillo: Siéntese en gabriela silla en la cual arlene pies no toquen el piso  Utilice guerrero dedo chandra del pie para trazar cada letra del abecedario  Utilice sólo guerrero pie y tobillo y [employer] movimientos pequeños  Sita 2 series  Estiramiento de la pantorrilla:     Sentado estiramiento para la pantorrilla con gabriela toalla  Siéntese en el piso con las dos piernas enfrente suyo   Pase gabriela toalla en forma de U alrededor de la planta del pie lesionado  Agarre el final de cada extremo de la toalla y tráigala hacia el tronco  Mantenga guerrero pierna y espalda derecha  No se incline hacia ita mientras neftali de la toalla  Sostenga está posición por 30 segundos  Luego relaje por 30 segundos  Realice 2 series de 10  De pie, estiramiento de la pantorrilla: Párese frente a gabriela pared con el pie samantha hacia ita y la rodilla ligeramente doblada  Mantenga la pierna del pie lesionado extendida y detrás suyo con los dedos de los pies apuntando un poco [place]  [place] los dos talones contra el piso y presione arlene caderas hacia ita  Mantenga guerrero espalda derecha sin arquearla  Sostenga por 30 segundos  Luego relaje por 30 segundos  Realice 2 series de 10  Repita con la pierna doblada  Realice 2 series de 10  Cómo realizar ejercicios de fortalecimiento de manera carvajal: Después que usted pueda realizar los ejercicios del rango de movimiento sin dolor, puede empezar con los ejercicios de fortalecimiento  Pregúntele a guerrero médico cuándo puede comenzar con los ejercicios de estabilidad o equilibrio  Movimientos del tobillo en 4 direcciones: Siéntese en el piso con las piernas enfrente suyo  Froylan soporte [employer] talones en el suelo  Flexión dorsal: Empiece con los dedos de los pies mirando [place]  Traiga arlene dedos de los pies [place] guerrero cuerpo  Despacio regrese a la posición inicial  Realice 3 series de 5  Flexión plantar: Empiece con los dedos de los pies mirando [place]  Empuje arlene dedos hacia adelante de usted  Despacio regrese a la posición inicial  Realice 3 series de 5  Inversión: [employer] con los dedos de los pies mirando [place]  Mueva arlene dedos [employer], mirándose el toi al [place]  Despacio regrese a la posición inicial  Realice 3 series de 5  Eversión: [employer] con los dedos de los pies mirando [place] katerina Mederos dedos hacia afuera, alejados el [place] del otro  Despacio regrese a la posición inicial  Realice 3 series de 5  Flexión de los dedos del pie con gabriela toalla: Sentado en un silla con los dos pies contra el piso  Coloque gabriela toalla pequeña en el piso enfrente de guerrero pie lesionado  Agarre el centro de la toalla con los dedos de pie y traiga la toalla [place] usted  Relájese y repita  Sita 1 serie de 5  Levantar las canicas: Sentado en un silla con los dos pies contra el piso  Coloque 20 canicas en el piso enfrente de guerrero pie lesionado  Utilice arlene dedos para levantar gabriela canica a la vez para colocarla dentro de gabriela vasija  Repita hasta que haya recogido todas las canicas  Realice 1 serie  Levantamiento de talones:     Levantada de talones: De pie con el peso de guerrero cuerpo distribuido igualmente en ambos pies  Agárrese del espaldar de gabriela silla o de gabriela pared para mantener el equilibrio  Levante el pie samantha del piso para que guerrero peso se vuelque al pie lesionado  Levante del piso el talón de guerrero pie lesionado tanto cisco pueda  Despacio baje guerrero talón al piso  Sita 1 serie de 10  Doble levantada de los talones: De pie con el peso de guerrero cuerpo distribuido igualmente en ambos pies  Agárrese del espaldar de gabriela silla o de gabriela pared para mantener el equilibrio  Levante del piso arlene dos talones tanto cisco pueda  Despacio baje arlene talones al piso  Sita 1 serie de 10  Caminar sobre el talón y en puntillas:     Caminar sobre el talón: Empezar en posición de pie  Levante arlene dedos de los pies y camine sobre arlene [place]  Mantenga los dedos levantados tanto cisco le sea posible  Realice 2 series de 10  Caminar en puntillas: Empezar en posición de pie  Levante arlene talones del piso y camine sobre la planta de arlene dedos de los pies  Mantenga arlene talones levantados [employer] sea posible  Realice 2 series de 10         Cómo realizar un ejercicio de equilibrio de forma carvajal: Después que realice los ejercicios de acondicionamiento sin dolor, usted puede empezar con estos ejercicios básicos de estabilidad  Pídale a guerrero médico ejercicios de estabilidad más avanzados  Levantamiento de gabriela kailee pierna: De pie con el peso de guerrero cuerpo distribuido igualmente en ambos pies o agárrese del espaldar de gabriela silla o de gabriela pared  No se incline hacia un lado  Levante el pie samantha del piso para que guerrero peso se vuelque al pie lesionado  Sostenga el equilibrio sobre guerrero [employer]  Pregúntele a guerrero médico por cuánto tiempo tiene que [employer]  Llame a guerrero médico o fisioterapeuta si:  Usted tiene un nuevo dolor o el dolor empeora  Usted tiene preguntas o inquietudes acerca de guerrero afección, cuidado o programa de ejercicios  © Copyright Maria Alejandrae Lebron 2022 Information is for End User's use only and may not be sold, redistributed or otherwise used for commercial purposes  Esta información es sólo para uso en educación  Guerrero intención no es darle un consejo médico sobre enfermedades o tratamientos  Colsulte con guerrero Jayy Larve farmacéutico antes de seguir cualquier régimen médico para saber si es seguro y efectivo para usted

## 2023-03-03 NOTE — TELEPHONE ENCOUNTER
CALLED PATIENT STATES SHE WILL CALL BACK AFTER HER APPT WITH CARDIOLOGY AND SCHEDULE HER F/U APPT WITH PCP

## 2023-03-23 ENCOUNTER — CONSULT (OUTPATIENT)
Dept: CARDIOLOGY CLINIC | Facility: CLINIC | Age: 58
End: 2023-03-23

## 2023-03-23 VITALS
HEIGHT: 68 IN | WEIGHT: 193 LBS | SYSTOLIC BLOOD PRESSURE: 176 MMHG | DIASTOLIC BLOOD PRESSURE: 94 MMHG | HEART RATE: 78 BPM | BODY MASS INDEX: 29.25 KG/M2

## 2023-03-23 DIAGNOSIS — I25.10 CORONARY ARTERY DISEASE INVOLVING NATIVE HEART, UNSPECIFIED VESSEL OR LESION TYPE, UNSPECIFIED WHETHER ANGINA PRESENT: ICD-10-CM

## 2023-03-23 DIAGNOSIS — E11.9 TYPE 2 DIABETES MELLITUS WITHOUT COMPLICATION, WITHOUT LONG-TERM CURRENT USE OF INSULIN (HCC): ICD-10-CM

## 2023-03-23 DIAGNOSIS — R09.89 LEFT CAROTID BRUIT: ICD-10-CM

## 2023-03-23 DIAGNOSIS — I25.2 MI, OLD: ICD-10-CM

## 2023-03-23 DIAGNOSIS — I10 PRIMARY HYPERTENSION: ICD-10-CM

## 2023-03-23 DIAGNOSIS — I77.1 STENOSIS OF LEFT SUBCLAVIAN ARTERY (HCC): ICD-10-CM

## 2023-03-23 DIAGNOSIS — R01.1 CARDIAC MURMUR: ICD-10-CM

## 2023-03-23 DIAGNOSIS — I25.10 CORONARY ARTERY DISEASE INVOLVING NATIVE CORONARY ARTERY OF NATIVE HEART WITHOUT ANGINA PECTORIS: Primary | ICD-10-CM

## 2023-03-23 RX ORDER — NIFEDIPINE 30 MG/1
30 TABLET, EXTENDED RELEASE ORAL DAILY
Qty: 30 TABLET | Refills: 3 | Status: SHIPPED | OUTPATIENT
Start: 2023-03-23

## 2023-03-23 NOTE — PROGRESS NOTES
[facility]  [address] [facility], [place] [address]  Phone#  [phone]   Fax#  [phone]  *-*-*-*-*-*-*-*-*-*-*-*-*-*-*-*-*-*-*-*-*-*-*-*-*-*-*-*-*-*-*-*-*-*-*-*-*-*-*-*-*-*-*-*-*-*-*-*-*-*-*-*-*-*                                              Cardiology Consultation       ENCOUNTER DATE: 23 3:48 PM  PATIENT NAME: Felipe Moulton   : 1965    MRN: [id number]  AGE:57 y o  SEX: female  [address] Mares MD     PRIMARY CARE PHYSICIAN: Drake Vyas MD    ACTIVE DIAGNOSIS THIS VISIT  1  Coronary artery disease involving native coronary artery of native heart without angina pectoris  POCT ECG    Stress test only, exercise    Echo complete w/ contrast if indicated      2  Primary hypertension  Ambulatory Referral to Cardiology    POCT ECG    Echo complete w/ contrast if indicated    NIFEdipine (PROCARDIA XL) 30 mg 24 hr tablet      3  Type 2 diabetes mellitus without complication, without long-term current use of insulin ([address] )        4  Coronary artery disease involving native heart, unspecified vessel or lesion type, unspecified whether angina present  Ambulatory Referral to Cardiology      5  MI, old  Stress test only, exercise    Echo complete w/ contrast if indicated      6  Left carotid bruit  VAS carotid complete study      7  Stenosis of left subclavian artery (HCC)        8   Cardiac murmur          ACTIVE PROBLEM LIST  Patient Active Problem List   Diagnosis   • Type 2 diabetes mellitus without complication, without long-term current use of insulin ([address] )   • Coronary artery disease involving native heart   • Primary hypertension   • Gastroesophageal reflux disease without esophagitis   • Right foot pain   • H/O foot surgery   • Chronic pain of right ankle   • Peroneal tendonitis of right lower extremity   • Left carotid bruit   • Stenosis of left subclavian artery (HCC)   • Cardiac murmur       CARDIOLOGY SPECIALTY COMMENTS  Patient referred by Artur Joaquin MD for evaluation of primary hypertension and coronary artery disease  HPI:    Patient is a 71-year-old female who was sent for evaluation of coronary artery disease and hypertension  History is very limited and the patient has no records  She allegedly had a heart attack approximately 30 years ago in [facility] in the [place]  [facility] everywhere but the patient was not recognized  She was in the hospital approximately 7 days  She did not have a cardiac catheterization  She knows absolutely no details  She has a cardiologist in [facility] but has no records from him  She has never had a treadmill stress test or an echocardiogram performed  She has not taken sublingual nitroglycerin  She is chest discomfort which is substernal and radiates into her left arm  It can last from seconds to an entire morning  It usually lasts approximately 5 to 15 minutes  It occurs at rest but also awakens her from her sleep  It also occurs when she carries something heavy  It is accompanied at times with nausea and a cold sweat  She has a history of hypertension  She states that she takes her blood pressure at home and it has been as low as 166 7 and as high as 700 systolic  Today is 176/74  However on a office visit on 2/27/23, it was 120/80 and on in the office visit on 1/24/2023, it was 154/75  She states that she has frequent headaches which she associates with the chest discomfort  Most likely the headaches are from her high blood pressure although she also could be developing anginal discomfort from her high blood pressure  DISCUSSION/PLAN:        · Begin nifedipine XL 30 mg daily to improve blood pressure control    · Patient should continue her other antihypertensive medications which include lisinopril 20 mg daily and carvedilol 25 mg 2 times a day  · regular stress test  · 2D echocardiogram  · Carotid ultrasound  · May eventually need a CTA of the chest and neck for evaluation of patient's murmur/carotid bruit/subclavian stenosis    Lab Studies:      Lab Results   Component Value Date    HGBA1C 6 8 (A) 09/26/2022      Lab Results   Component Value Date    EGFR 104 04/15/2021    EGFR 91 11/27/2020    SODIUM 144 04/15/2021    SODIUM 136 (L) 11/27/2020    K 5 4 (H) 04/15/2021    K 3 8 11/27/2020     (H) 04/15/2021     11/27/2020    CO2 20 (L) 04/15/2021    CO2 25 11/27/2020    BUN 4 (L) 04/15/2021    BUN 9 11/27/2020    CREATININE 0 59 (L) 04/15/2021    CREATININE 0 75 11/27/2020     Lab Results   Component Value Date    WBC 9 10 04/15/2021    WBC 9 20 11/27/2020    HGB 14 6 04/15/2021    HGB 14 6 11/27/2020    HCT 43 5 04/15/2021    HCT 42 4 11/27/2020    MCV 93 04/15/2021    MCV 92 11/27/2020    MCH 31 2 04/15/2021    MCH 31 8 11/27/2020    MCHC 33 7 04/15/2021    MCHC 34 4 11/27/2020     04/15/2021     11/27/2020        Lab Results   Component Value Date    CALCIUM 10 2 04/15/2021    CALCIUM 9 1 11/27/2020    AST 54 (H) 04/15/2021    AST 24 11/27/2020    ALT 28 04/15/2021    ALT 7 (L) 11/27/2020    ALKPHOS 89 04/15/2021    ALKPHOS 69 11/27/2020     Results for orders placed or performed in visit on 03/23/23   POCT ECG    Narrative    Normal sinus rhythm at a rate of 78 bpm   Normal EKG           Current Outpatient Medications:   •  carvedilol (COREG) 25 mg tablet, Take 1 tablet (25 mg total) by mouth 2 (two) times a day with meals, Disp: 60 tablet, Rfl: 2  •  lisinopril (ZESTRIL) 20 mg tablet, Take 1 tablet (20 mg total) by mouth daily, Disp: 30 tablet, Rfl: 0  •  metFORMIN (GLUCOPHAGE) 1000 MG tablet, Take 1 tablet (1,000 mg total) by mouth daily with breakfast, Disp: 90 tablet, Rfl: 1  •  NIFEdipine (PROCARDIA XL) 30 mg 24 hr tablet, Take 1 tablet (30 mg total) by mouth daily, Disp: 30 tablet, Rfl: 3  •  omeprazole (PriLOSEC) 40 MG capsule, Take 1 capsule (40 mg total) by mouth daily, Disp: 30 capsule, Rfl: 2  •  meloxicam (Mobic) 15 mg tablet, Take 1 tablet (15 mg total) by mouth daily (Patient not taking: Reported on 3/23/2023), Disp: 30 tablet, Rfl: 1  No Known Allergies    Past Medical History:   Diagnosis Date   • Abnormal Pap smear of cervix     1/2023 NILM pap/+ HPV18   • Coronary artery disease    • Depression    • Diabetes mellitus ([facility] [address] )    • GERD (gastroesophageal reflux disease)    • Hyperlipidemia    • Hypertension      Social History     Socioeconomic History   • Marital status:      Spouse name: Not on file   • Number of children: 1   • Years of education: Not on file   • Highest education level: Some college, no degree   Occupational History   • Occupation: Works in a [employer]   Tobacco Use   • Smoking status: Every Day     Packs/day: 0 25     Types: Cigarettes   • Smokeless tobacco: Never   Vaping Use   • Vaping Use: Never used   Substance and Sexual Activity   • Alcohol use: Yes     Comment: couple times/year   • Drug use: Never   • Sexual activity: Not Currently     Partners: Male     Birth control/protection: Female Sterilization, Post-menopausal   Other Topics Concern   • Not on file   Social History Narrative   • Not on file     Social Determinants of Health     Financial Resource Strain: Low Risk    • Difficulty of Paying Living Expenses: Not very hard   Food Insecurity: No Food Insecurity   • Worried About Running Out of Food in the Last Year: Never true   • Ran Out of Food in the Last Year: Never true   Transportation Needs: No Transportation Needs   • Lack of Transportation (Medical): No   • Lack of Transportation (Non-Medical):  No   Physical Activity: Not on file   Stress: Not on file   Social Connections: Not on file   Intimate Partner Violence: Not on file   Housing Stability: Not on file      Family History   Problem Relation Age of Onset   • Cancer Brother    • Breast cancer Neg Hx    • Colon cancer Neg Hx    • Ovarian cancer Neg Hx Past Surgical History:   Procedure Laterality Date   • ABDOMINOPLASTY  2001   • AUGMENTATION BREAST Bilateral 2001   • BREAST BIOPSY Left 2016    benign   • FOOT FRACTURE SURGERY Right 2013   • LIPOSUCTION TRUNK  2001   • TUBAL LIGATION  1997       Review of Systems:  Review of Systems   Constitutional: Positive for diaphoresis  HENT: Negative  Respiratory: Negative for cough, chest tightness, shortness of breath, wheezing and stridor  Cardiovascular: Positive for chest pain  Negative for palpitations and leg swelling  Gastrointestinal: Positive for nausea  Endocrine: Negative  Genitourinary: Negative  Musculoskeletal: Negative  Skin: Negative  Allergic/Immunologic: Negative  Neurological: Positive for headaches  Hematological: Negative  Psychiatric/Behavioral: Negative  Physical Exam:  BP (!) 176/94 (BP Location: Right arm, Patient Position: Sitting, Cuff Size: Adult)   Pulse 78   Ht 5' 8" (1 727 m)   Wt 87 5 kg (193 lb)   BMI 29 35 kg/m²     PREVIOUS WEIGHTS:   Wt Readings from Last 10 Encounters:   03/23/23 87 5 kg (193 lb)   02/27/23 85 3 kg (188 lb)   01/24/23 85 5 kg (188 lb 9 6 oz)   09/26/22 86 2 kg (190 lb)   05/02/21 88 2 kg (194 lb 7 1 oz)   04/15/21 89 kg (196 lb 3 4 oz)   11/27/20 89 kg (196 lb 3 2 oz)      Physical Exam  Constitutional:       General: She is not in acute distress  Appearance: She is well-developed  HENT:      Head: Normocephalic and atraumatic  Neck:      Thyroid: No thyromegaly  Vascular: No carotid bruit or JVD  Trachea: No tracheal deviation  Cardiovascular:      Rate and Rhythm: Normal rate and regular rhythm  Pulses: Normal pulses  Carotid pulses are 2+ on the right side and 2+ on the left side with bruit  Radial pulses are 2+ on the right side and 2+ on the left side  Femoral pulses are 2+ on the right side and 2+ on the left side  Heart sounds: Murmur heard  No friction rub   No gallop  Comments: There is a loud grade 3/6 systolic ejection murmur which is heard in the upper left chest from the mid sternum to the anterior axillary line and radiates to the base of the neck and to a lesser extent up the carotid to the ear  Pulmonary:      Effort: Pulmonary effort is normal  No respiratory distress  Breath sounds: Normal breath sounds  No wheezing, rhonchi or rales  Chest:      Chest wall: No tenderness  Abdominal:      General: Bowel sounds are normal  There is no distension  Palpations: Abdomen is soft  Tenderness: There is no abdominal tenderness  Musculoskeletal:         General: Normal range of motion  Cervical back: Normal range of motion and neck supple  Right lower leg: No edema  Left lower leg: No edema  Skin:     General: Skin is warm and dry  Neurological:      General: No focal deficit present  Mental Status: She is alert and oriented to person, place, and time  Gait: Gait normal    Psychiatric:         Mood and Affect: Mood normal          Behavior: Behavior normal          Thought Content: Thought content normal          Judgment: Judgment normal      ======================================================   I have personally reviewed pertinent reports  I spent 70 minutes on the patient's office visit  This time was spent on the day of the visit  I had direct contact with the patient in the office on the day of the visit  Greater than 50% of the total time was spent obtaining a history, examining patient, answering all patient questions, arranging and discussing plan of care with patient as well as directly providing instructions  Additional time then spent on orders and office chart  Portions of the record may have been created with voice recognition software  Occasional wrong word or "sound a like" substitutions may have occurred due to the inherent limitations of voice recognition software   Read the chart carefully and recognize, using context, where substitutions have occurred      SIGNATURES:   Franky Santa MD

## 2023-04-03 ENCOUNTER — TELEPHONE (OUTPATIENT)
Dept: FAMILY MEDICINE CLINIC | Facility: CLINIC | Age: 58
End: 2023-04-03

## 2023-04-03 ENCOUNTER — APPOINTMENT (OUTPATIENT)
Dept: LAB | Facility: CLINIC | Age: 58
End: 2023-04-03

## 2023-04-03 ENCOUNTER — OFFICE VISIT (OUTPATIENT)
Dept: GASTROENTEROLOGY | Facility: CLINIC | Age: 58
End: 2023-04-03

## 2023-04-03 VITALS
WEIGHT: 192.8 LBS | TEMPERATURE: 98.3 F | SYSTOLIC BLOOD PRESSURE: 157 MMHG | HEART RATE: 85 BPM | BODY MASS INDEX: 29.32 KG/M2 | DIASTOLIC BLOOD PRESSURE: 92 MMHG

## 2023-04-03 DIAGNOSIS — K21.9 GASTROESOPHAGEAL REFLUX DISEASE WITHOUT ESOPHAGITIS: ICD-10-CM

## 2023-04-03 DIAGNOSIS — R13.19 OTHER DYSPHAGIA: ICD-10-CM

## 2023-04-03 DIAGNOSIS — R63.4 UNINTENTIONAL WEIGHT LOSS: ICD-10-CM

## 2023-04-03 DIAGNOSIS — E11.9 TYPE 2 DIABETES MELLITUS WITHOUT COMPLICATION, WITHOUT LONG-TERM CURRENT USE OF INSULIN (HCC): ICD-10-CM

## 2023-04-03 DIAGNOSIS — K92.1 BLACK STOOLS: ICD-10-CM

## 2023-04-03 DIAGNOSIS — Z12.11 SCREENING FOR COLON CANCER: ICD-10-CM

## 2023-04-03 DIAGNOSIS — K21.9 GASTROESOPHAGEAL REFLUX DISEASE, UNSPECIFIED WHETHER ESOPHAGITIS PRESENT: Primary | ICD-10-CM

## 2023-04-03 DIAGNOSIS — I25.10 CORONARY ARTERY DISEASE INVOLVING NATIVE HEART, UNSPECIFIED VESSEL OR LESION TYPE, UNSPECIFIED WHETHER ANGINA PRESENT: ICD-10-CM

## 2023-04-03 LAB
ALBUMIN SERPL BCP-MCNC: 3.8 G/DL (ref 3.5–5)
ALP SERPL-CCNC: 89 U/L (ref 46–116)
ALT SERPL W P-5'-P-CCNC: 27 U/L (ref 12–78)
ANION GAP SERPL CALCULATED.3IONS-SCNC: 3 MMOL/L (ref 4–13)
AST SERPL W P-5'-P-CCNC: 19 U/L (ref 5–45)
BASOPHILS # BLD AUTO: 0.02 THOUSANDS/ÂΜL (ref 0–0.1)
BASOPHILS NFR BLD AUTO: 0 % (ref 0–1)
BILIRUB SERPL-MCNC: 0.47 MG/DL (ref 0.2–1)
BUN SERPL-MCNC: 9 MG/DL (ref 5–25)
CALCIUM SERPL-MCNC: 9.2 MG/DL (ref 8.3–10.1)
CHLORIDE SERPL-SCNC: 107 MMOL/L (ref 96–108)
CHOLEST SERPL-MCNC: 273 MG/DL
CO2 SERPL-SCNC: 26 MMOL/L (ref 21–32)
CREAT SERPL-MCNC: 0.9 MG/DL (ref 0.6–1.3)
EOSINOPHIL # BLD AUTO: 0.14 THOUSAND/ÂΜL (ref 0–0.61)
EOSINOPHIL NFR BLD AUTO: 1 % (ref 0–6)
ERYTHROCYTE [DISTWIDTH] IN BLOOD BY AUTOMATED COUNT: 14.1 % (ref 11.6–15.1)
GFR SERPL CREATININE-BSD FRML MDRD: 71 ML/MIN/1.73SQ M
GLUCOSE P FAST SERPL-MCNC: 163 MG/DL (ref 65–99)
HCT VFR BLD AUTO: 42.3 % (ref 34.8–46.1)
HDLC SERPL-MCNC: 38 MG/DL
HGB BLD-MCNC: 14.5 G/DL (ref 11.5–15.4)
IMM GRANULOCYTES # BLD AUTO: 0.04 THOUSAND/UL (ref 0–0.2)
IMM GRANULOCYTES NFR BLD AUTO: 0 % (ref 0–2)
LDLC SERPL CALC-MCNC: 193 MG/DL (ref 0–100)
LYMPHOCYTES # BLD AUTO: 4.17 THOUSANDS/ÂΜL (ref 0.6–4.47)
LYMPHOCYTES NFR BLD AUTO: 40 % (ref 14–44)
MCH RBC QN AUTO: 31.1 PG (ref 26.8–34.3)
MCHC RBC AUTO-ENTMCNC: 34.3 G/DL (ref 31.4–37.4)
MCV RBC AUTO: 91 FL (ref 82–98)
MONOCYTES # BLD AUTO: 0.55 THOUSAND/ÂΜL (ref 0.17–1.22)
MONOCYTES NFR BLD AUTO: 5 % (ref 4–12)
NEUTROPHILS # BLD AUTO: 5.39 THOUSANDS/ÂΜL (ref 1.85–7.62)
NEUTS SEG NFR BLD AUTO: 54 % (ref 43–75)
NRBC BLD AUTO-RTO: 0 /100 WBCS
PLATELET # BLD AUTO: 298 THOUSANDS/UL (ref 149–390)
PMV BLD AUTO: 11.8 FL (ref 8.9–12.7)
POTASSIUM SERPL-SCNC: 4.1 MMOL/L (ref 3.5–5.3)
PROT SERPL-MCNC: 7.8 G/DL (ref 6.4–8.4)
RBC # BLD AUTO: 4.66 MILLION/UL (ref 3.81–5.12)
SODIUM SERPL-SCNC: 136 MMOL/L (ref 135–147)
TRIGL SERPL-MCNC: 208 MG/DL
WBC # BLD AUTO: 10.31 THOUSAND/UL (ref 4.31–10.16)

## 2023-04-03 RX ORDER — POLYETHYLENE GLYCOL-3350 AND ELECTROLYTES WITH FLAVOR PACK 240; 5.84; 2.98; 6.72; 22.72 G/278.26G; G/278.26G; G/278.26G; G/278.26G; G/278.26G
4000 POWDER, FOR SOLUTION ORAL ONCE
Qty: 1 ML | Refills: 0 | Status: SHIPPED | OUTPATIENT
Start: 2023-04-03 | End: 2023-04-03

## 2023-04-03 RX ORDER — POLYETHYLENE GLYCOL 3350, SODIUM SULFATE ANHYDROUS, SODIUM BICARBONATE, SODIUM CHLORIDE, POTASSIUM CHLORIDE 236; 22.74; 6.74; 5.86; 2.97 G/4L; G/4L; G/4L; G/4L; G/4L
4000 POWDER, FOR SOLUTION ORAL ONCE
Qty: 4000 ML | Refills: 0 | Status: SHIPPED | OUTPATIENT
Start: 2023-04-03 | End: 2023-04-03

## 2023-04-03 RX ORDER — SUCRALFATE 1 G/1
1 TABLET ORAL 4 TIMES DAILY
Qty: 120 TABLET | Refills: 0 | Status: SHIPPED | OUTPATIENT
Start: 2023-04-03

## 2023-04-03 RX ORDER — OMEPRAZOLE 40 MG/1
40 CAPSULE, DELAYED RELEASE ORAL
Qty: 60 CAPSULE | Refills: 2 | Status: SHIPPED | OUTPATIENT
Start: 2023-04-03

## 2023-04-03 RX ORDER — BISACODYL 5 MG/1
20 TABLET, DELAYED RELEASE ORAL ONCE
Qty: 4 TABLET | Refills: 0 | Status: SHIPPED | OUTPATIENT
Start: 2023-04-03 | End: 2023-04-03

## 2023-04-03 NOTE — ASSESSMENT & PLAN NOTE
20-30 lb weight loss over last 8 months  Unintentional  Concern for possible underlying occult malignancy  Could also be due to impaired appetite due to her worsening reflux/heartburn    · Schedule for bidirectional endoscopy  · Check CT chest/abdomen/pelvis with IV contrast to rule out occult malignancy  · Check CMP prior to CT to assess renal function

## 2023-04-03 NOTE — ASSESSMENT & PLAN NOTE
Possibly indication of GI bleeding although with formed stools so unclear if true melena  If true, could be due to PUD from NSAID use vs gastritis vs H pylori infection vs AVM vs lower GI source    · Check CBC  · Plan for bidirectional endoscopy  · Stop NSAIDs  · Increase omeprazole to twice daily

## 2023-04-03 NOTE — PATIENT INSTRUCTIONS
Schedule for EGD and colon  Please follow instructions for the bowel prep  Stop taking the Advil  You may take Tylenol if needed for headaches/migraine  We will check a CT scan  We will also check blood work         Colonoscopy  GoLYTELY Bowel Preparation Instructions    The OR/GI Lab will contact you the evening prior to your procedure with your exact arrival time  Our practice requires a 1 week notice for any cancellations or rescheduling  We kindly ask that you immediately notify us of any changes including any new medications that are prescribed  Thank you for your cooperation  WEEK BEFORE YOUR PROCEDURE:  Do not take Iron tablets for one week  5 days prior to the appointment AVOID vegetables and fruits with skins or seeds, nuts, corn, popcorn and whole grain breads  : your bowel prep from your pharmacy  A prescription has been sent electronically  Purchase: (2) 5 mg Dulcolax (bisacodyl) tablets - over the counter  Arrange responsible transportation for day of the procedure  DAY BEFORE THE PROCEDURE:   CLEAR liquids only for entire day prior  Nothing red, orange or purple  You MAY have:    Soda  Water  Broth Gatorade  Jell-O  Popsicles Coffee/tea without milk/creamer     YOU MAY NOT HAVE:  Solid foods   Milk and milk products    Juice with pulp    Bowel Preparation  The morning before your procedure you may mix the solution according to the package instructions and refrigerate until you are ready to start drinking it later in the day  Entire bowel prep should be completed  Evening before your procedure (5:00 pm - 6:00 pm): Take (2) 5 mg Dulcolax laxative tablets  Drink ½ of the premixed prep solution (64 ounces/2 liters) by drinking (1) 8-ounce cup every 10-15 minutes until the container is ½ completed (64 ounces/2 liters)  Beginning 5 hours before your procedure:    Drink the remaining premixed bowel prep solution     Bowel prep should be completed 4 hours prior to your procedure time  NOTHING TO EAT OR DRINK AFTER MIDNIGHT -EXCEPT YOUR BOWEL PREP    ___________________________________________________________________________________________________________________________________________________________________________    DAY OF THE PROCEDURE:    You may brush your teeth  Leave all jewelry at home  Remove any facial piercings, if able  Please arrive for your procedure as indicated by the OR / GI Lab / Endoscopy Unit  The hospital will contact you the day before with your exact arrival time  Make sure you have arranged ahead of time for a responsible adult (18 or older) to accompany and drive you home after the procedure  Please discuss any transportation concerns with our staff prior to your procedure  The effects of the anesthesia can persist for 24 hours  After receiving the sedation, you must exercise caution before engaging in any activity that could harm yourself and others (such as driving a car)  Do not make any important decisions or do not drink any alcoholic beverages during this time period  After your procedure, you may have anything you'd like to eat or drink  You will probably want to start with something light  Please include plenty of fluids  Avoid items that cause gas such as sodas and salads  SPECIAL INSTRUCTIONS:    For patients currently taking blood thinners and/or antiplatelet therapy our office will contact the prescribing provider  Our office will contact you with any required changes to your medication regimen  Blood thinner (i e  - Coumadin, Pradaxa, Lovenox, Xarelto, Eliquis)  ? Continue (Do Not Stop)  ? Stop______________for_____________days prior to the procedure  Antiplatelet (i e  - Plavix, Aggrenox, Effient, Brilinta)  ? Continue (Do Not Stop)  ? Stop______________for_____________days prior to the procedure  Diabetes: If you are Diabetic, please see separate Diabetic Instruction Sheet            Prescribed medications:  Do not stop your aspirin, or any of your other medications (unless instructed otherwise)  Take the rest of your prescribed medications with small sips of water at least 2 hours prior to your procedure  For any questions or concerns related to your bowel preparation or pre-procedure instructions, please contact our office at [phone]  Thank you for choosing [facility]!         Scheduled date of colon/egd (as of today) 4/13/23  Physician performing: dr Fidencio Saucedo  Location of procedure:  [facility]  Instructions given to patient:  tiana  Clearances: na

## 2023-04-03 NOTE — ASSESSMENT & PLAN NOTE
Mild dysphagia to solids and liquids  Sounds more oropharyngeal but could be related to worsening heartburn/reflux which could be from hiatal hernia vs stricture vs other    · EGD for further evaluation  · Increase omeprazole to twice daily  · Carafate QID  · If EGD is unremarkable and symptoms persist, consider manometry to rule out underlying dysmotility vs speech therapy eval/video swallow eval

## 2023-04-03 NOTE — ASSESSMENT & PLAN NOTE
No prior CRC screening  No family history of colon cancer  Reports black stool which may be due to frequent NSAID use for migraines causing gastritis or PUD  Also with unintentional weight loss  Not on antiplatelets or anticoagulants    · Schedule for colonoscopy  · Golytely/Dulcolax bowel prep

## 2023-04-03 NOTE — TELEPHONE ENCOUNTER
Patient in need of refills for     lisinopril (ZESTRIL) 20 mg tablet      Patient has an appointment on 4/11/23

## 2023-04-03 NOTE — PROGRESS NOTES
[facility] - Outpatient Consultation  Marleen Saucedo 65 y o  female MRN: [id number]  Encounter: [id number]      ASSESSMENT & PLAN:      Problem List Items Addressed This Visit        Digestive    Esophageal reflux - Primary     Has history of heartburn and reflux for 5+ years but worsening over the last 8 months  No improvement despite omeprazole once daily  Some mild dysphagia to solids and liquids although this may be more oropharyngeal  Etiology unclear for worsening heartburn  Possibly related to hiatal hernia vs ibuprofen use (migraines) vs H pylori infection vs other  Other alarm symptoms include unintentional weight loss which may be due to these symptoms impairing appetite  · Schedule EGD to assess for cause of worsening reflux  · Increase omeprazole to twice daily; take 30 minutes before breakfast and before dinner  · Start Carafate 4 times daily for added symptom relief  · Advised patient to stop NSAIDs         Relevant Medications    omeprazole (PriLOSEC) 40 MG capsule    sucralfate (CARAFATE) 1 g tablet    Other Relevant Orders    EGD    Other dysphagia     Mild dysphagia to solids and liquids  Sounds more oropharyngeal but could be related to worsening heartburn/reflux which could be from hiatal hernia vs stricture vs other  · EGD for further evaluation  · Increase omeprazole to twice daily  · Carafate QID  · If EGD is unremarkable and symptoms persist, consider manometry to rule out underlying dysmotility vs speech therapy eval/video swallow eval         Relevant Medications    sucralfate (CARAFATE) 1 g tablet    Other Relevant Orders    EGD    CBC and Platelet       Other    Screening for colon cancer     No prior CRC screening  No family history of colon cancer  Reports black stool which may be due to frequent NSAID use for migraines causing gastritis or PUD  Also with unintentional weight loss  Not on antiplatelets or anticoagulants    · Schedule for colonoscopy  · Golytely/Dulcolax bowel prep         Relevant Medications    bisacodyl (DULCOLAX) 5 mg EC tablet    Other Relevant Orders    Colonoscopy    Unintentional weight loss     20-30 lb weight loss over last 8 months  Unintentional  Concern for possible underlying occult malignancy  Could also be due to impaired appetite due to her worsening reflux/heartburn  · Schedule for bidirectional endoscopy  · Check CT chest/abdomen/pelvis with IV contrast to rule out occult malignancy  · Check CMP prior to CT to assess renal function         Relevant Medications    bisacodyl (DULCOLAX) 5 mg EC tablet    Other Relevant Orders    Colonoscopy    EGD    CBC and Platelet    Comprehensive metabolic panel    CT chest abdomen pelvis w contrast    Black stools     Possibly indication of GI bleeding although with formed stools so unclear if true melena  If true, could be due to PUD from NSAID use vs gastritis vs H pylori infection vs AVM vs lower GI source  · Check CBC  · Plan for bidirectional endoscopy  · Stop NSAIDs  · Increase omeprazole to twice daily         Relevant Medications    bisacodyl (DULCOLAX) 5 mg EC tablet    Other Relevant Orders    Colonoscopy    EGD    CBC and Platelet    Comprehensive metabolic panel     Orders Placed This Encounter   Procedures   • CT chest abdomen pelvis w contrast     Standing Status:   Future     Standing Expiration Date:   4/3/2027     Scheduling Instructions: For procedures with both oral (PO) and IV contrast:            The patient will need to drink barium for this test  Barium needs to be picked up in the registration area at least one day prior to your study  For out of network (non-[facility]) orders please bring your prescription when picking up oral contrast  For AM Appointments: Drink one bottle of barium before bed time the evening before your scheduled test   Drink 1/2 of the second bottle one hour prior to your test  Please bring other 1/2 bottle with you to drink at the time of your study  For PM Appointments: Drink one bottle of barium before 9:00am on the day of your test  Drink 1/2 of the second bottle one hour prior to your test  Please bring other 1/2 bottle with you to drink at the time of your study  Nothing to eat 3 hours prior to your test  In addition to the barium, clear liquids are also permitted up until the time of the scan  Clear liquids includes water, black coffee or tea, apple juice or clear broth  If possible wear clothing without any metal in the abdomen area  Sweat suit,       sports bra or bra without underwire may eliminate the need to change  Please bring your insurance cards, a form of photo ID and a list of your medications with you  Arrive 15 minutes prior to your appointment time in order to register  On the day of your test, please bring any prior CT or MRI studies of this area with you that were not performed at a [facility]  For procedures with ONLY IV contrast:            Nothing to eat 3 hours prior to your test  Clear liquids are permitted up until the time of the scan  Clear liquids includes water, black coffee or tea, apple juice or clear broth  If possible wear clothing without any metal in the abdomen area  Sweat suit, sports bra or bra without underwire may eliminate the need to change  Please bring your insurance cards, a form of photo ID and a list of your medications with you  Arrive 15 minutes prior to your appointment time in order to register  On the day of your test, please bring any prior CT or MRI studies of this area with you that were not performed at a [facility]  To schedule this appointment, please contact Central Scheduling at [phone]  Order Specific Question:   Is the patient pregnant? Answer:   No     Order Specific Question:   What is the patient's sedation requirement?      Answer:   No Sedation     Order Specific Question:   Did the patient ever have bariatric surgery? Answer:   No     Order Specific Question:   Contrast information:     Answer:   IV     Order Specific Question:   Did the patient ever have a reaction to x-ray dye? If yes, please verify the type of allergy and order the contrast allergy prep  Answer:   No     Order Specific Question:   Release to patient through Mychart     Answer:   Immediate     Order Specific Question:   Reason for Exam (FREE TEXT)     Answer:   Unintentional weigh loss     Order Specific Question:   When should the test be performed? Answer:   in 1 week   • CBC and Platelet     Standing Status:   Future     Number of Occurrences:   1     Standing Expiration Date:   4/3/2024   • Comprehensive metabolic panel     This is a patient instruction: Patient fasting for 8 hours or longer recommended  Standing Status:   Future     Number of Occurrences:   1     Standing Expiration Date:   4/3/2024   • Colonoscopy     Standing Status:   Future     Standing Expiration Date:   4/3/2024     Order Specific Question:   Requested date? Answer:   4/13/2023     Order Specific Question:   Requested Site     Answer:   [place]     Order Specific Question:   Performing Location     Answer:   Endoscopy Dept     Order Specific Question:   Are you the performing provider? Answer:   No     Order Specific Question:   Performing Physician     Answer:   Carri Sainz [[id number]]     Order Specific Question:   Anesthesia required? Answer:   Yes     Order Specific Question:   Is this procedure associated with another Endo procedure? Answer:   Yes     Order Specific Question:   Specify related procedure: Answer:   EGD     Order Specific Question:   Are you an Ambulatory ? (No Provider Response Needed)     Answer:   Yes     Order Specific Question:   Ready to Schedule? Answer:   No     Order Specific Question:   Has there been a change from the initial order?      Answer:   No   • EGD     Standing Status: Future     Standing Expiration Date:   4/3/2024     Order Specific Question:   Requested date? Answer:   4/13/2023     Order Specific Question:   Requested Site     Answer:   [place]     Order Specific Question:   Performing Location     Answer:   Endoscopy Dept     Order Specific Question:   Are you the performing provider? Answer:   No     Order Specific Question:   Performing Physician     Answer:   Silviano Babcock [[id number]]     Order Specific Question:   Anesthesia required? Answer:   Yes     Order Specific Question:   Is this procedure associated with another Endo procedure? Answer:   Yes     Order Specific Question:   Specify related procedure: Answer:   Colonoscopy     Order Specific Question:   Are you an Ambulatory ? (No Provider Response Needed)     Answer:   Yes     Order Specific Question:   Ready to Schedule? Answer:   No     Order Specific Question:   Has there been a change from the initial order? Answer:   No       Follow-up: 3 months  ______________________________________________________________________    HPI:  64-year-old female presents to GI clinic for evaluation of worsening heartburn and need for CRC screening  Patient accompanied by daughter who helped provide Finnish-interpretation (patient is Finnish-speaking only)  Reports chronic heartburn and reflux for 5+ years but worsening heartburn over the last 8 months or so  Has affected her appetite  No improvement despite omeprazole once daily which she takes in the morning  Has noticed some dysphagia (in the back of throat) with solids and liquids  Denies nausea or vomiting  She does take frequent ibuprofen for migraines and sometimes take on an empty stomach  She has also noticed weight loss of approximately 20-30 lb over the last 8 months  She has noticed some black or dark-colored stools but states they are formed  Denies hematochezia  She does not take iron pills or Pepto-Bismol   She has had endoscopy in the remote past (outside the [address]) and states she had some gastritis  Report is not available for review  Has never had CRC screening  Denies any family history of colon cancer  Not on antiplatelets or anticoagulants      Last endoscopy: Remote at [facility]; report not available  Last colonoscopy: None    REVIEW OF SYSTEMS:    CONSTITUTIONAL: Denies any fever, chills, rigors  HEENT: No earache or tinnitus, denies hearing loss or visual disturbances  CARDIOVASCULAR: No chest pain or palpitations  RESPIRATORY: Denies any cough, hemoptysis, shortness of breath or dyspnea on exertion  GASTROINTESTINAL: As noted in the History of Present Illness  GENITOURINARY: No problems with urination, denies any hematuria or dysuria  NEUROLOGIC: No dizziness or vertigo   MUSCULOSKELETAL: Denies any muscle or joint pain   SKIN: Denies skin rashes or itching  ENDOCRINE: Denies excessive thirst, denies intolerance to heat or cold  PSYCHOSOCIAL: Denies depression or anxiety, denies any recent memory loss     Historical Information   Past Medical History:   Diagnosis Date   • Abnormal Pap smear of cervix     1/2023 NILM pap/+ HPV18   • Coronary artery disease    • Depression    • Diabetes mellitus ([address] )    • GERD (gastroesophageal reflux disease)    • Hyperlipidemia    • Hypertension      Past Surgical History:   Procedure Laterality Date   • ABDOMINOPLASTY  2001   • AUGMENTATION BREAST Bilateral 2001   • BREAST BIOPSY Left 2016    benign   • FOOT FRACTURE SURGERY Right 2013   • LIPOSUCTION TRUNK  2001   • TUBAL LIGATION  1997     Social History   Social History     Substance and Sexual Activity   Alcohol Use Yes    Comment: couple times/year     Social History     Substance and Sexual Activity   Drug Use Never     Social History     Tobacco Use   Smoking Status Every Day   • Packs/day: 0 25   • Types: Cigarettes   Smokeless Tobacco Never     Family History   Problem Relation Age of Onset   • Cancer Brother    • Breast cancer Neg Hx    • Colon cancer Neg Hx    • Ovarian cancer Neg Hx        MEDICATIONS & ALLERGIES:    Current Outpatient Medications   Medication Instructions   • bisacodyl (DULCOLAX) 20 mg, Oral, Once   • carvedilol (COREG) 25 mg, Oral, 2 times daily with meals   • lisinopril (ZESTRIL) 20 mg, Oral, Daily   • meloxicam (MOBIC) 15 mg, Oral, Daily   • metFORMIN (GLUCOPHAGE) 1,000 mg, Oral, Daily with breakfast   • NIFEdipine (PROCARDIA XL) 30 mg, Oral, Daily   • omeprazole (PRILOSEC) 40 mg, Oral, 2 times daily before meals   • polyethylene glycol (GaviLyte-C) 4000 mL solution 4,000 mL, Oral, Once   • sucralfate (CARAFATE) 1 g, Oral, 4 times daily     No Known Allergies    PHYSICAL EXAM:      Objective   Blood pressure 157/92, pulse 85, temperature 98 3 °F (36 8 °C), temperature source Tympanic, weight 87 5 kg (192 lb 12 8 oz), not currently breastfeeding  Body mass index is 29 32 kg/m²  General Appearance:   Alert, cooperative, no distress   HEENT:   Normocephalic, atraumatic, anicteric     Neck:   Supple, symmetrical, trachea midline   Lungs:   Equal chest rise, respirations unlabored    Heart:   Regular rate and rhythm   Abdomen:   Soft, mild discomfort in upper abdomen on palpation, non-distended; normal bowel sounds; no masses, no organomegaly    Rectal:   Deferred    Extremities:   No cyanosis, clubbing or edema    Neuro: Moves all 4 extremities    Skin:   No jaundice, rashes, or lesions      LAB RESULTS:     No visits with results within 1 Day(s) from this visit     Latest known visit with results is:   Office Visit on 01/24/2023   Component Date Value   • Case Report 01/24/2023                      Value:Gynecologic Cytology Report                       Case: [id number]                                  Authorizing Provider:  JOSHUA Judge   Collected:           01/24/2023 3104              Ordering Location:     [facility]      Received:            01/24/2023 [facility]                                                          First Screen:          Donn Cintron, CT                                                    Specimen:    LIQUID-BASED PAP, SCREENING, Cervix                                                       • Primary Interpretation 01/24/2023 Negative for intraepithelial lesion or malignancy    • Specimen Adequacy 01/24/2023 Satisfactory for evaluation  Endocervical/transformation zone component present  • Additional Information 01/24/2023                      Value: This result contains rich text formatting which cannot be displayed here  • HPV Other HR 01/24/2023 Negative    • HPV16 01/24/2023 Negative    • HPV18 01/24/2023 Positive (A)        RADIOLOGY RESULTS: I have personally reviewed pertinent imaging studies  VANIA Serrano  Chief Gastroenterology Fellow  [facility]  Division of Gastroenterology and Hepatology  Available on [email or web address]

## 2023-04-03 NOTE — ASSESSMENT & PLAN NOTE
Has history of heartburn and reflux for 5+ years but worsening over the last 8 months  No improvement despite omeprazole once daily  Some mild dysphagia to solids and liquids although this may be more oropharyngeal  Etiology unclear for worsening heartburn  Possibly related to hiatal hernia vs ibuprofen use (migraines) vs H pylori infection vs other  Other alarm symptoms include unintentional weight loss which may be due to these symptoms impairing appetite    · Schedule EGD to assess for cause of worsening reflux  · Increase omeprazole to twice daily; take 30 minutes before breakfast and before dinner  · Start Carafate 4 times daily for added symptom relief  · Advised patient to stop NSAIDs

## 2023-04-04 DIAGNOSIS — E11.9 TYPE 2 DIABETES MELLITUS WITHOUT COMPLICATION, WITHOUT LONG-TERM CURRENT USE OF INSULIN (HCC): ICD-10-CM

## 2023-04-04 DIAGNOSIS — I10 PRIMARY HYPERTENSION: ICD-10-CM

## 2023-04-04 RX ORDER — LISINOPRIL 20 MG/1
20 TABLET ORAL DAILY
Qty: 30 TABLET | Refills: 0 | Status: SHIPPED | OUTPATIENT
Start: 2023-04-04

## 2023-04-04 NOTE — TELEPHONE ENCOUNTER
I have sent the Lisinopril 20 mg, #30 to the pharmacy, please remind pt of her appt in the office      We need to monitor her BP and make sure she is on the right medications instead of just refilling them, thank you

## 2023-04-04 NOTE — TELEPHONE ENCOUNTER
PATIENT'S DAUGHTER CAME IN OFFICE STATING SHE RECEIVED A MESSAGE FROM MY CHART THAT HER MEDICATION FOR HBP WAS SENT TO THE PHARMACY SHE DID GO AND THEY STATED THEY HAVE NOT RECEIVED ANYTHING , SHE STATES MOM NEEDS MEDICATION HER BP IS HIGH AND HAS NOT TAKEN MEDICATION SINCE FRIDAY    SHE STATES TO SEND PRESCRIPTION TO [facility] WHICH IS UPDATED ON HER CHART

## 2023-04-11 ENCOUNTER — OFFICE VISIT (OUTPATIENT)
Dept: FAMILY MEDICINE CLINIC | Facility: CLINIC | Age: 58
End: 2023-04-11

## 2023-04-11 VITALS
DIASTOLIC BLOOD PRESSURE: 82 MMHG | RESPIRATION RATE: 18 BRPM | WEIGHT: 191 LBS | OXYGEN SATURATION: 99 % | HEIGHT: 68 IN | TEMPERATURE: 97.6 F | SYSTOLIC BLOOD PRESSURE: 146 MMHG | HEART RATE: 92 BPM | BODY MASS INDEX: 28.95 KG/M2

## 2023-04-11 DIAGNOSIS — E11.9 TYPE 2 DIABETES MELLITUS WITHOUT COMPLICATION, WITHOUT LONG-TERM CURRENT USE OF INSULIN (HCC): Primary | ICD-10-CM

## 2023-04-11 DIAGNOSIS — J30.9 ALLERGIC RHINITIS, UNSPECIFIED SEASONALITY, UNSPECIFIED TRIGGER: ICD-10-CM

## 2023-04-11 DIAGNOSIS — I10 PRIMARY HYPERTENSION: ICD-10-CM

## 2023-04-11 DIAGNOSIS — E78.5 HYPERLIPIDEMIA, UNSPECIFIED HYPERLIPIDEMIA TYPE: ICD-10-CM

## 2023-04-11 RX ORDER — LISINOPRIL 20 MG/1
20 TABLET ORAL DAILY
Qty: 30 TABLET | Refills: 5 | Status: SHIPPED | OUTPATIENT
Start: 2023-04-11

## 2023-04-11 RX ORDER — CARVEDILOL 25 MG/1
25 TABLET ORAL 2 TIMES DAILY WITH MEALS
Qty: 60 TABLET | Refills: 5 | Status: SHIPPED | OUTPATIENT
Start: 2023-04-11

## 2023-04-11 RX ORDER — FLUTICASONE PROPIONATE 50 MCG
1 SPRAY, SUSPENSION (ML) NASAL DAILY
Qty: 16 G | Refills: 1 | Status: SHIPPED | OUTPATIENT
Start: 2023-04-11

## 2023-04-11 RX ORDER — CETIRIZINE HYDROCHLORIDE 10 MG/1
10 TABLET ORAL DAILY
Qty: 30 TABLET | Refills: 1 | Status: SHIPPED | OUTPATIENT
Start: 2023-04-11

## 2023-04-11 RX ORDER — SIMVASTATIN 10 MG
10 TABLET ORAL
Qty: 30 TABLET | Refills: 5 | Status: SHIPPED | OUTPATIENT
Start: 2023-04-11

## 2023-04-11 NOTE — PROGRESS NOTES
Name: Nii Pretty      : 1965      MRN: [id number]  Encounter Provider: Monika Hamilton MD  Encounter Date: 2023   Encounter department: [address] [place] [address]     1  Type 2 diabetes mellitus without complication, without long-term current use of insulin ([facility])  Assessment & Plan:    Lab Results   Component Value Date    HGBA1C 6 8 (A) 2022    sa    Lab Results   Component Value Date    HGBA1C 6 8 (A) 2022     Improved  - Current medications:  Metformin 1000 mg daily  - Keep log of blood glucose readings  - Encouraged: Diabetic Diet, Regular exercise, Weight loss   - +Htn: Lisinopril 20 mg  - +Hld: Zocor 10 mg  - Ophthalmology: pending  - DM Foot exam: pending  - Dentist: pending    Orders:  -     metFORMIN (GLUCOPHAGE) 1000 MG tablet; Take 1 tablet (1,000 mg total) by mouth daily with breakfast  -     lisinopril (ZESTRIL) 20 mg tablet; Take 1 tablet (20 mg total) by mouth daily    2  Primary hypertension  -     lisinopril (ZESTRIL) 20 mg tablet; Take 1 tablet (20 mg total) by mouth daily  -     carvedilol (COREG) 25 mg tablet; Take 1 tablet (25 mg total) by mouth 2 (two) times a day with meals    3  Hyperlipidemia, unspecified hyperlipidemia type  Assessment & Plan:  Lab Results   Component Value Date/Time    CHOLESTEROL 273 (H) 2023 02:03 PM    TRIG 208 (H) 2023 02:03 PM    HDL 38 (L) 2023 02:03 PM    LDLCALC 193 (H) 2023 02:03 PM     Continue simvastatin 10 mg daily  Medication adherence discussed  Low Fat Diet, regular Exercise      Orders:  -     simvastatin (ZOCOR) 10 mg tablet; Take 1 tablet (10 mg total) by mouth daily at bedtime    4  Allergic rhinitis, unspecified seasonality, unspecified trigger  -     cetirizine (ZyrTEC) 10 mg tablet;  Take 1 tablet (10 mg total) by mouth daily  -     fluticasone (FLONASE) 50 mcg/act nasal spray; 1 spray into each nostril daily         Lea William "is a 65 y o  female  has a past medical history of Abnormal Pap smear of cervix, Coronary artery disease, Depression, Diabetes mellitus ([address] ), GERD (gastroesophageal reflux disease), Hyperlipidemia, and Hypertension who presented to the office today to follow up for chronic conditions  Patient states she has been experiencing a dry cough and  new-onset chest pain over the last 3 weeks  She states that this began approximately 1 month ago which  believes started after beginning new medication, Nifedipine  Per patient report this cough is nonproductive, worsens at night when lying down, denies any blood-tinged sputum  Chest pain began 3 weeks ago, she notes  has experienced pain like this once before approximately 20 years ago and had to visit a hospital where she was diagnosed with an unspecified \"infarction  \" She states she experiences visual light spots during these episodes, along with pain radiating from occiput to left middle finger  She denies any chest pain association with increased activity  She states these episodes last for a few minutes and resolve with 81 mg of aspirin  She states she has experienced shortness of breath during these episodes with what she believes to be a wheeze  States she has a long history of \"racing heart  \"  Denies any dizziness, syncope with episodes  She has also experienced acid reflux which she states feels different in character from her current chest pain  The following portions of the patient's history were reviewed and updated as appropriate: allergies, current medications, past family history, past medical history, past social history, past surgical history and problem list         Review of Systems   Constitutional: Negative for chills and fever  HENT: Negative for congestion, rhinorrhea and sore throat  Respiratory: Negative for cough and shortness of breath  Cardiovascular: Positive for chest pain     Gastrointestinal: Negative for diarrhea, " "nausea and vomiting  Genitourinary: Negative for dysuria  Musculoskeletal: Negative for back pain  Skin: Negative for rash  Neurological: Positive for dizziness and headaches  Psychiatric/Behavioral: Positive for sleep disturbance         Current Outpatient Medications on File Prior to Visit   Medication Sig   • bisacodyl (DULCOLAX) 5 mg EC tablet Take 4 tablets (20 mg total) by mouth once for 1 dose   • meloxicam (Mobic) 15 mg tablet Take 1 tablet (15 mg total) by mouth daily (Patient not taking: Reported on 3/23/2023)   • NIFEdipine (PROCARDIA XL) 30 mg 24 hr tablet Take 1 tablet (30 mg total) by mouth daily   • omeprazole (PriLOSEC) 40 MG capsule Take 1 capsule (40 mg total) by mouth 2 (two) times a day before meals   • polyethylene glycol (GaviLyte-C) 4000 mL solution Take 4,000 mL by mouth once for 1 dose   • sucralfate (CARAFATE) 1 g tablet Take 1 tablet (1 g total) by mouth 4 (four) times a day       Objective     /82 (BP Location: Left arm, Patient Position: Sitting, Cuff Size: Standard)   Pulse 92   Temp 97 6 °F (36 4 °C) (Temporal)   Resp 18   Ht 5' 8\" (1 727 m)   Wt 86 6 kg (191 lb)   SpO2 99%   BMI 29 04 kg/m²     Physical Exam  Ralph Mckeon MD  "

## 2023-04-25 PROBLEM — E78.5 HYPERLIPIDEMIA: Status: ACTIVE | Noted: 2023-04-25

## 2023-04-25 PROBLEM — J30.9 ALLERGIC RHINITIS: Status: ACTIVE | Noted: 2023-04-25

## 2023-04-25 NOTE — ASSESSMENT & PLAN NOTE
Lab Results   Component Value Date/Time    CHOLESTEROL 273 (H) 04/03/2023 02:03 PM    TRIG 208 (H) 04/03/2023 02:03 PM    HDL 38 (L) 04/03/2023 02:03 PM    LDLCALC 193 (H) 04/03/2023 02:03 PM     Continue simvastatin 10 mg daily  Medication adherence discussed  Low Fat Diet, regular Exercise

## 2023-04-25 NOTE — ASSESSMENT & PLAN NOTE
Lab Results   Component Value Date    HGBA1C 6 8 (A) 09/26/2022    sa    Lab Results   Component Value Date    HGBA1C 6 8 (A) 09/26/2022     Improved  - Current medications:  Metformin 1000 mg daily  - Keep log of blood glucose readings  - Encouraged: Diabetic Diet, Regular exercise, Weight loss   - +Htn: Lisinopril 20 mg  - +Hld: Zocor 10 mg  - Ophthalmology: pending  - DM Foot exam: pending  - Dentist: pending

## 2023-05-09 ENCOUNTER — HOSPITAL ENCOUNTER (OUTPATIENT)
Dept: NON INVASIVE DIAGNOSTICS | Facility: HOSPITAL | Age: 58
Discharge: HOME | End: 2023-05-09
Attending: INTERNAL MEDICINE

## 2023-05-09 VITALS — HEIGHT: 68 IN | WEIGHT: 191 LBS | BODY MASS INDEX: 28.95 KG/M2

## 2023-05-09 DIAGNOSIS — I25.2 MI, OLD: ICD-10-CM

## 2023-05-09 DIAGNOSIS — I25.10 CORONARY ARTERY DISEASE INVOLVING NATIVE CORONARY ARTERY OF NATIVE HEART WITHOUT ANGINA PECTORIS: ICD-10-CM

## 2023-05-09 LAB
CHEST PAIN STATEMENT: NORMAL
MAX DIASTOLIC BP: 78 MMHG
MAX HEART RATE: 101 BPM
MAX HR PERCENT: 61 %
MAX HR: 101 BPM
MAX PREDICTED HEART RATE: 164 BPM
MAX. SYSTOLIC BP: 156 MMHG
PROTOCOL NAME: NORMAL
RATE PRESSURE PRODUCT: NORMAL
REASON FOR TERMINATION: NORMAL
SL CV STRESS RECOVERY BP: NORMAL MMHG
SL CV STRESS RECOVERY HR: 81 BPM
SL CV STRESS RECOVERY O2 SAT: 97 %
SL CV STRESS STAGE REACHED: 1
STRESS ANGINA INDEX: 1
STRESS BASELINE BP: NORMAL MMHG
STRESS BASELINE HR: 83 BPM
STRESS O2 SAT REST: 98 %
STRESS PEAK HR: 101 BPM
STRESS POST ESTIMATED WORKLOAD: 4.6 METS
STRESS POST EXERCISE DUR MIN: 2 MIN
STRESS POST EXERCISE DUR SEC: 42 SEC
STRESS POST O2 SAT PEAK: 98 %
STRESS POST PEAK BP: 156 MMHG
TARGET HR FORMULA: NORMAL
TEST INDICATION: NORMAL
TIME IN EXERCISE PHASE: NORMAL

## 2023-05-10 ENCOUNTER — HOSPITAL ENCOUNTER (OUTPATIENT)
Dept: NON INVASIVE DIAGNOSTICS | Facility: HOSPITAL | Age: 58
Discharge: HOME | End: 2023-05-10
Attending: INTERNAL MEDICINE

## 2023-05-10 DIAGNOSIS — R09.89 LEFT CAROTID BRUIT: ICD-10-CM

## 2023-05-25 ENCOUNTER — HOSPITAL ENCOUNTER (OUTPATIENT)
Dept: NON INVASIVE DIAGNOSTICS | Facility: HOSPITAL | Age: 58
Discharge: HOME | End: 2023-05-25

## 2023-05-25 VITALS
HEIGHT: 68 IN | WEIGHT: 191 LBS | HEART RATE: 82 BPM | BODY MASS INDEX: 28.95 KG/M2 | DIASTOLIC BLOOD PRESSURE: 91 MMHG | SYSTOLIC BLOOD PRESSURE: 131 MMHG

## 2023-05-25 DIAGNOSIS — I25.2 MI, OLD: ICD-10-CM

## 2023-05-25 DIAGNOSIS — I25.10 CORONARY ARTERY DISEASE INVOLVING NATIVE CORONARY ARTERY OF NATIVE HEART WITHOUT ANGINA PECTORIS: ICD-10-CM

## 2023-05-25 DIAGNOSIS — I10 PRIMARY HYPERTENSION: ICD-10-CM

## 2023-05-25 LAB
AORTIC ROOT: 3.1 CM
APICAL FOUR CHAMBER EJECTION FRACTION: 52 %
E WAVE DECELERATION TIME: 297 MS
FRACTIONAL SHORTENING: 24 (ref 28–44)
INTERVENTRICULAR SEPTUM IN DIASTOLE (PARASTERNAL SHORT AXIS VIEW): 1.4 CM
INTERVENTRICULAR SEPTUM: 1.4 CM (ref 0.6–1.1)
LAAS-AP2: 16.8 CM2
LAAS-AP4: 18.4 CM2
LEFT ATRIUM SIZE: 4.2 CM
LEFT INTERNAL DIMENSION IN SYSTOLE: 3.8 CM (ref 2.1–4)
LEFT VENTRICULAR INTERNAL DIMENSION IN DIASTOLE: 5 CM (ref 3.5–6)
LEFT VENTRICULAR POSTERIOR WALL IN END DIASTOLE: 1.4 CM
LEFT VENTRICULAR STROKE VOLUME: 58 ML
LVSV (TEICH): 58 ML
MV E'TISSUE VEL-SEP: 4 CM/S
MV PEAK A VEL: 0.84 M/S
MV PEAK E VEL: 60 CM/S
MV STENOSIS PRESSURE HALF TIME: 86 MS
MV VALVE AREA P 1/2 METHOD: 2.56
RIGHT ATRIUM AREA SYSTOLE A4C: 13.1 CM2
RIGHT VENTRICLE ID DIMENSION: 2.9 CM
SL CV LEFT ATRIUM LENGTH A2C: 4.9 CM
SL CV LV EF: 50
SL CV PED ECHO LEFT VENTRICLE DIASTOLIC VOLUME (MOD BIPLANE) 2D: 119 ML
SL CV PED ECHO LEFT VENTRICLE SYSTOLIC VOLUME (MOD BIPLANE) 2D: 61 ML
TRICUSPID ANNULAR PLANE SYSTOLIC EXCURSION: 2.3 CM

## 2023-06-02 PROBLEM — Z12.11 SCREENING FOR COLON CANCER: Status: RESOLVED | Noted: 2023-04-03 | Resolved: 2023-06-02

## 2023-06-03 DIAGNOSIS — K21.9 GASTROESOPHAGEAL REFLUX DISEASE WITHOUT ESOPHAGITIS: ICD-10-CM

## 2023-06-03 RX ORDER — OMEPRAZOLE 40 MG/1
CAPSULE, DELAYED RELEASE ORAL
Qty: 60 CAPSULE | Refills: 2 | Status: SHIPPED | OUTPATIENT
Start: 2023-06-03

## 2023-06-24 PROBLEM — I25.2 MI, OLD: Status: ACTIVE | Noted: 2023-06-24

## 2023-06-26 ENCOUNTER — OFFICE VISIT (OUTPATIENT)
Dept: CARDIOLOGY CLINIC | Facility: CLINIC | Age: 58
End: 2023-06-26
Payer: MEDICARE

## 2023-06-26 VITALS
WEIGHT: 190.8 LBS | BODY MASS INDEX: 29.01 KG/M2 | DIASTOLIC BLOOD PRESSURE: 70 MMHG | SYSTOLIC BLOOD PRESSURE: 110 MMHG | HEART RATE: 88 BPM

## 2023-06-26 DIAGNOSIS — E11.9 TYPE 2 DIABETES MELLITUS WITHOUT COMPLICATION, WITHOUT LONG-TERM CURRENT USE OF INSULIN (HCC): ICD-10-CM

## 2023-06-26 DIAGNOSIS — I10 PRIMARY HYPERTENSION: ICD-10-CM

## 2023-06-26 DIAGNOSIS — I20.9 ANGINA PECTORIS (HCC): ICD-10-CM

## 2023-06-26 DIAGNOSIS — R09.89 LEFT CAROTID BRUIT: ICD-10-CM

## 2023-06-26 DIAGNOSIS — E78.2 MIXED HYPERLIPIDEMIA: ICD-10-CM

## 2023-06-26 DIAGNOSIS — I25.2 MI, OLD: ICD-10-CM

## 2023-06-26 DIAGNOSIS — I25.118 CORONARY ARTERY DISEASE OF NATIVE ARTERY OF NATIVE HEART WITH STABLE ANGINA PECTORIS (HCC): Primary | ICD-10-CM

## 2023-06-26 DIAGNOSIS — I77.1 STENOSIS OF LEFT SUBCLAVIAN ARTERY (HCC): ICD-10-CM

## 2023-06-26 PROCEDURE — 99214 OFFICE O/P EST MOD 30 MIN: CPT | Performed by: INTERNAL MEDICINE

## 2023-06-26 RX ORDER — ROSUVASTATIN CALCIUM 10 MG/1
10 TABLET, COATED ORAL DAILY
Qty: 30 TABLET | Refills: 5 | Status: SHIPPED | OUTPATIENT
Start: 2023-06-26

## 2023-06-26 RX ORDER — RANOLAZINE 500 MG/1
500 TABLET, EXTENDED RELEASE ORAL 2 TIMES DAILY
Qty: 60 TABLET | Refills: 5 | Status: SHIPPED | OUTPATIENT
Start: 2023-06-26

## 2023-06-26 RX ORDER — NITROGLYCERIN 0.4 MG/1
0.4 TABLET SUBLINGUAL
Qty: 25 TABLET | Refills: 2 | Status: SHIPPED | OUTPATIENT
Start: 2023-06-26

## 2023-06-26 NOTE — PROGRESS NOTES
[facility]  [address] [facility] [address]   [address]  [id number]  Phone#  [phone]   Fax#  [phone]  *-*-*-*-*-*-*-*-*-*-*-*-*-*-*-*-*-*-*-*-*-*-*-*-*-*-*-*-*-*-*-*-*-*-*-*-*-*-*-*-*-*-*-*-*-*-*-*-*-*-*-*-*-*                                   Cardiology Follow Up      ENCOUNTER DATE: 23 9:52 AM  PATIENT NAME: Jeana Barillas   : 1965    MRN: [id number]  AGE:57 y o  SEX: female  [address] Est[address] Mares MD     PRIMARY CARE PHYSICIAN: Ned Quezada MD    ACTIVE DIAGNOSIS THIS VISIT  1  Coronary artery disease of native artery of native heart with stable angina pectoris ([facility] [address] )        2  Mixed hyperlipidemia        3  Primary hypertension        4  Left carotid bruit        5  Stenosis of left subclavian artery (HCC)        6  Type 2 diabetes mellitus without complication, without long-term current use of insulin (HCC)        7  MI, old          ACTIVE PROBLEM LIST  Patient Active Problem List   Diagnosis   • Type 2 diabetes mellitus without complication, without long-term current use of insulin ([facility] [address] )   • Coronary artery disease involving native heart   • Primary hypertension   • Esophageal reflux   • Right foot pain   • H/O foot surgery   • Chronic pain of right ankle   • Peroneal tendonitis of right lower extremity   • Left carotid bruit   • Stenosis of left subclavian artery (HCC)   • Cardiac murmur   • Other dysphagia   • Unintentional weight loss   • Black stools   • Hyperlipidemia   • Allergic rhinitis   • MI, old       CARDIOLOGY SPECIALTY COMMENTS  Patient referred by Ned Quezada MD for evaluation of primary hypertension and coronary artery disease  Patient first seen on 3/23/2023  -  Patient is a 41-year-old female who was sent for evaluation of coronary artery disease and hypertension  History is very limited and the patient has no records    -  She allegedly had a heart attack approximately 30 years ago in [facility] in the [place]  Tried to access [facility]'s records through care everywhere but the patient was not recognized  She was in the hospital approximately 7 days  She did not have a cardiac catheterization  She knows absolutely no details  She has a cardiologist in [facility] but has no records from him  She has never had a treadmill stress test or an echocardiogram performed  She has not taken sublingual nitroglycerin  -  She is chest discomfort which is substernal and radiates into her left arm  It can last from seconds to an entire morning  It usually lasts approximately 5 to 15 minutes  It occurs at rest but also awakens her from her sleep  It also occurs when she carries something heavy  It is accompanied at times with nausea and a cold sweat  -  She has a history of hypertension  She states that she has frequent headaches which she associates with the chest discomfort  5/9/2023 stress test: Patient only able to achieve 61% MPHR due to calf discomfort walking on the treadmill  Stress test negative at markedly sob maximal activity    5/10/2023 less than 50% bilateral carotid artery stenosis    5/25/2023 echocardiogram: Mildly reduced to low normal systolic function at 64%  Mild global hypokinesis  Grade 1 diastolic dysfunction  Mild AR  INTERVAL HISTORY:        Patient with a history of coronary artery disease and stable angina pectoris, hypertension, mixed hyperlipidemia, left carotid bruit without significant stenosis and left subclavian artery significant stenosis returns  He has an old MI and diabetes mellitus  She has chest discomfort on a daily basis occurring at random but is not very active to assess an exertional relationship  With a prior history of coronary artery disease and myocardial infarction, further evaluation of her chest pain appears prudent  She also has significant dyspnea on exertion which may also be an anginal equivalent      Since last visit she had a exercise stress test but was only able to achieve 61% of her MPHR due to leg fatigue  With this poor exercise tolerance, the treadmill test is worthless in ruling out coronary artery disease  She also had a carotid ultrasound which demonstrated less than 50% bilateral carotid artery stenosis but a significant left subclavian stenosis  An echocardiogram demonstrated her LVEF to be low normal to mildly reduced at 50% with mild global hypokinesis  She has grade 1 diastolic dysfunction and mild aortic regurgitation  She is presently not on a statin    She had been on simvastatin but ran out per her PCP and it never got renewed    DISCUSSION/PLAN:          · Begin rosuvastatin 10 mg daily  · Begin Ranexa 500 mg twice daily  · Begin nitroglycerin 0 4 mg sublingual as needed for chest pain  · Obtain a pharmacologic nuclear stress test  · Return in 6 weeks    Lab Studies:    Lab Results   Component Value Date    CHOLESTEROL 273 (H) 04/03/2023     Lab Results   Component Value Date    TRIG 208 (H) 04/03/2023     Lab Results   Component Value Date    HDL 38 (L) 04/03/2023     Lab Results   Component Value Date    LDLCALC 193 (H) 04/03/2023       Lab Results   Component Value Date    HGBA1C 6 8 (A) 09/26/2022      Lab Results   Component Value Date    EGFR 71 04/03/2023    EGFR 104 04/15/2021    EGFR 91 11/27/2020    SODIUM 136 04/03/2023    SODIUM 144 04/15/2021    SODIUM 136 (L) 11/27/2020    K 4 1 04/03/2023    K 5 4 (H) 04/15/2021    K 3 8 11/27/2020     04/03/2023     (H) 04/15/2021     11/27/2020    CO2 26 04/03/2023    CO2 20 (L) 04/15/2021    CO2 25 11/27/2020    BUN 9 04/03/2023    BUN 4 (L) 04/15/2021    BUN 9 11/27/2020    CREATININE 0 90 04/03/2023    CREATININE 0 59 (L) 04/15/2021    CREATININE 0 75 11/27/2020     Lab Results   Component Value Date    WBC 10 31 (H) 04/03/2023    WBC 9 10 04/15/2021    WBC 9 20 11/27/2020    HGB 14 5 04/03/2023    HGB 14 6 04/15/2021    HGB 14 6 11/27/2020 HCT 42 3 04/03/2023    HCT 43 5 04/15/2021    HCT 42 4 11/27/2020    MCV 91 04/03/2023    MCV 93 04/15/2021    MCV 92 11/27/2020    MCH 31 1 04/03/2023    MCH 31 2 04/15/2021    MCH 31 8 11/27/2020    MCHC 34 3 04/03/2023    MCHC 33 7 04/15/2021    MCHC 34 4 11/27/2020     04/03/2023     04/15/2021     11/27/2020      Lab Results   Component Value Date    CALCIUM 9 2 04/03/2023    CALCIUM 10 2 04/15/2021    CALCIUM 9 1 11/27/2020    AST 19 04/03/2023    AST 54 (H) 04/15/2021    AST 24 11/27/2020    ALT 27 04/03/2023    ALT 28 04/15/2021    ALT 7 (L) 11/27/2020    ALKPHOS 89 04/03/2023    ALKPHOS 89 04/15/2021    ALKPHOS 69 11/27/2020     No results found for this visit on 06/26/23        Current Outpatient Medications:   •  carvedilol (COREG) 25 mg tablet, Take 1 tablet (25 mg total) by mouth 2 (two) times a day with meals, Disp: 60 tablet, Rfl: 5  •  cetirizine (ZyrTEC) 10 mg tablet, Take 1 tablet (10 mg total) by mouth daily, Disp: 30 tablet, Rfl: 1  •  fluticasone (FLONASE) 50 mcg/act nasal spray, 1 spray into each nostril daily, Disp: 16 g, Rfl: 1  •  lisinopril (ZESTRIL) 20 mg tablet, Take 1 tablet (20 mg total) by mouth daily, Disp: 30 tablet, Rfl: 5  •  metFORMIN (GLUCOPHAGE) 1000 MG tablet, Take 1 tablet (1,000 mg total) by mouth daily with breakfast, Disp: 90 tablet, Rfl: 1  •  NIFEdipine (PROCARDIA XL) 30 mg 24 hr tablet, Take 1 tablet (30 mg total) by mouth daily, Disp: 30 tablet, Rfl: 3  •  omeprazole (PriLOSEC) 40 MG capsule, TAKE 1 CAPSULE BY MOUTH 2 TIMES A DAY BEFORE MEALS , Disp: 60 capsule, Rfl: 2  •  sucralfate (CARAFATE) 1 g tablet, Take 1 tablet (1 g total) by mouth 4 (four) times a day, Disp: 120 tablet, Rfl: 0  •  bisacodyl (DULCOLAX) 5 mg EC tablet, Take 4 tablets (20 mg total) by mouth once for 1 dose, Disp: 4 tablet, Rfl: 0  •  meloxicam (Mobic) 15 mg tablet, Take 1 tablet (15 mg total) by mouth daily (Patient not taking: Reported on 3/23/2023), Disp: 30 tablet, Rfl: 1  •  polyethylene glycol (GaviLyte-C) 4000 mL solution, Take 4,000 mL by mouth once for 1 dose, Disp: 1 mL, Rfl: 0  •  simvastatin (ZOCOR) 10 mg tablet, Take 1 tablet (10 mg total) by mouth daily at bedtime (Patient not taking: Reported on 6/26/2023), Disp: 30 tablet, Rfl: 5  No Known Allergies    Past Medical History:   Diagnosis Date   • Abnormal Pap smear of cervix     1/2023 NILM pap/+ HPV18   • Coronary artery disease    • Depression    • Diabetes mellitus ([facility] [address] )    • GERD (gastroesophageal reflux disease)    • Hyperlipidemia    • Hypertension      Social History     Socioeconomic History   • Marital status:      Spouse name: Not on file   • Number of children: 1   • Years of education: Not on file   • Highest education level: Some college, no degree   Occupational History   • Occupation: Works in a [employer]   Tobacco Use   • Smoking status: Every Day     Packs/day: 0 25     Types: Cigarettes   • Smokeless tobacco: Never   Vaping Use   • Vaping Use: Never used   Substance and Sexual Activity   • Alcohol use: Yes     Comment: couple times/year   • Drug use: Never   • Sexual activity: Not Currently     Partners: Male     Birth control/protection: Female Sterilization, Post-menopausal   Other Topics Concern   • Not on file   Social History Narrative   • Not on file     Social Determinants of Health     Financial Resource Strain: Low Risk  (1/24/2023)    Overall Financial Resource Strain (CARDIA)    • Difficulty of Paying Living Expenses: Not very hard   Food Insecurity: No Food Insecurity (1/24/2023)    Hunger Vital Sign    • Worried About Running Out of Food in the Last Year: Never true    • Ran Out of Food in the Last Year: Never true   Transportation Needs: No Transportation Needs (1/24/2023)    PRAPARE - Transportation    • Lack of Transportation (Medical): No    • Lack of Transportation (Non-Medical):  No   Physical Activity: Not on file   Stress: Not on file   Social Connections: Not on file Intimate Partner Violence: Not on file   Housing Stability: Not on file      Family History   Problem Relation Age of Onset   • Cancer Brother    • Breast cancer Neg Hx    • Colon cancer Neg Hx    • Ovarian cancer Neg Hx      Past Surgical History:   Procedure Laterality Date   • ABDOMINOPLASTY  2001   • AUGMENTATION BREAST Bilateral 2001   • BREAST BIOPSY Left 2016    benign   • FOOT FRACTURE SURGERY Right 2013   • LIPOSUCTION TRUNK  2001   • TUBAL LIGATION  1997       PREVIOUS WEIGHTS:   Wt Readings from Last 10 Encounters:   06/26/23 86 5 kg (190 lb 12 8 oz)   05/25/23 86 6 kg (191 lb)   05/09/23 86 6 kg (191 lb)   04/13/23 86 6 kg (191 lb)   04/11/23 86 6 kg (191 lb)   04/03/23 87 5 kg (192 lb 12 8 oz)   03/23/23 87 5 kg (193 lb)   02/27/23 85 3 kg (188 lb)   01/24/23 85 5 kg (188 lb 9 6 oz)   09/26/22 86 2 kg (190 lb)        Review of Systems:  Review of Systems   Respiratory: Positive for shortness of breath  Negative for cough, choking, chest tightness and wheezing  Cardiovascular: Positive for chest pain  Negative for palpitations and leg swelling  Musculoskeletal: Negative for gait problem  Skin: Negative for rash  Neurological: Negative for dizziness, tremors, syncope, weakness, light-headedness, numbness and headaches  Psychiatric/Behavioral: Negative for agitation and behavioral problems  The patient is not hyperactive  Physical Exam:  /70 (BP Location: Left arm, Patient Position: Sitting, Cuff Size: Adult)   Pulse 88   Wt 86 5 kg (190 lb 12 8 oz)   BMI 29 01 kg/m²     Physical Exam  Constitutional:       General: She is not in acute distress  Appearance: She is well-developed  HENT:      Head: Normocephalic and atraumatic  Neck:      Thyroid: No thyromegaly  Vascular: No carotid bruit or JVD  Trachea: No tracheal deviation  Cardiovascular:      Rate and Rhythm: Normal rate and regular rhythm  Pulses: Normal pulses        Heart sounds: Normal heart "sounds  No murmur heard  No friction rub  No gallop  Pulmonary:      Effort: Pulmonary effort is normal  No respiratory distress  Breath sounds: Normal breath sounds  No wheezing, rhonchi or rales  Chest:      Chest wall: No tenderness  Musculoskeletal:         General: Normal range of motion  Cervical back: Normal range of motion and neck supple  Right lower leg: No edema  Left lower leg: No edema  Skin:     General: Skin is warm and dry  Neurological:      General: No focal deficit present  Mental Status: She is alert and oriented to person, place, and time  Psychiatric:         Mood and Affect: Mood normal          Behavior: Behavior normal          Thought Content: Thought content normal          Judgment: Judgment normal        ======================================================  Imaging:   I have personally reviewed pertinent reports  I spent 30 minutes on the patient's office visit  This time was spent on the day of the visit  I had direct contact with the patient in the office on the day of the visit  Greater than 50% of the total time was spent obtaining a history, examining patient, answering all patient questions, arranging and discussing plan of care with patient as well as directly providing instructions  Additional time then spent on orders and office chart  Portions of the record may have been created with voice recognition software  Occasional wrong word or \"sound a like\" substitutions may have occurred due to the inherent limitations of voice recognition software  Read the chart carefully and recognize, using context, where substitutions have occurred      SIGNATURES:   Ivana Wells MD   "

## 2023-06-26 NOTE — PATIENT INSTRUCTIONS
Begin rosuvastatin 10 mg daily at bedtime  Begin Ranexa 500 mg twice a day  Begin nitroglycerin 0 4 mg under the tongue, do not swallow just let it dissolve, at the time you are having chest pain    May take up to 3 pills but need to be at least 5 minutes apart

## 2023-07-10 ENCOUNTER — HOSPITAL ENCOUNTER (OUTPATIENT)
Dept: NON INVASIVE DIAGNOSTICS | Facility: HOSPITAL | Age: 58
Discharge: HOME | End: 2023-07-10

## 2023-07-17 ENCOUNTER — HOSPITAL ENCOUNTER (OUTPATIENT)
Dept: RADIOLOGY | Facility: HOSPITAL | Age: 58
Discharge: HOME | End: 2023-07-17
Payer: MEDICARE

## 2023-07-17 ENCOUNTER — HOSPITAL ENCOUNTER (OUTPATIENT)
Dept: NON INVASIVE DIAGNOSTICS | Facility: HOSPITAL | Age: 58
Discharge: HOME | End: 2023-07-17
Payer: MEDICARE

## 2023-07-17 VITALS — BODY MASS INDEX: 28.79 KG/M2 | WEIGHT: 190 LBS | HEIGHT: 68 IN

## 2023-07-17 DIAGNOSIS — I20.9 ANGINA PECTORIS (HCC): ICD-10-CM

## 2023-07-17 DIAGNOSIS — I25.118 CORONARY ARTERY DISEASE OF NATIVE ARTERY OF NATIVE HEART WITH STABLE ANGINA PECTORIS (HCC): ICD-10-CM

## 2023-07-17 LAB
NUC STRESS EJECTION FRACTION: 48 %
RATE PRESSURE PRODUCT: NORMAL
SL CV REST NUCLEAR ISOTOPE DOSE: 10.5 MCI
SL CV STRESS NUCLEAR ISOTOPE DOSE: 30 MCI
SL CV STRESS RECOVERY BP: NORMAL MMHG
SL CV STRESS RECOVERY HR: 86 BPM
SL CV STRESS RECOVERY O2 SAT: 98 %
STRESS ANGINA INDEX: 0
STRESS BASELINE BP: NORMAL MMHG
STRESS BASELINE HR: 72 BPM
STRESS O2 SAT REST: 96 %
STRESS PEAK HR: 85 BPM
STRESS POST O2 SAT PEAK: 99 %
STRESS POST PEAK BP: 163 MMHG
STRESS/REST PERFUSION RATIO: 1.09

## 2023-07-17 PROCEDURE — 93016 CV STRESS TEST SUPVJ ONLY: CPT

## 2023-07-17 PROCEDURE — 78452 HT MUSCLE IMAGE SPECT MULT: CPT

## 2023-07-17 PROCEDURE — A9502 TC99M TETROFOSMIN: HCPCS

## 2023-07-17 PROCEDURE — G1004 CDSM NDSC: HCPCS

## 2023-07-17 PROCEDURE — 93017 CV STRESS TEST TRACING ONLY: CPT

## 2023-07-17 PROCEDURE — 93018 CV STRESS TEST I&R ONLY: CPT

## 2023-07-17 RX ORDER — REGADENOSON 0.08 MG/ML
0.4 INJECTION, SOLUTION INTRAVENOUS ONCE
Status: COMPLETED | OUTPATIENT
Start: 2023-07-17 | End: 2023-07-17

## 2023-07-17 RX ADMIN — REGADENOSON 0.4 MG: 0.08 INJECTION, SOLUTION INTRAVENOUS at 13:44

## 2023-07-24 LAB
CHEST PAIN STATEMENT: NORMAL
MAX DIASTOLIC BP: 88 MMHG
MAX HEART RATE: 88 BPM
MAX PREDICTED HEART RATE: 164 BPM
MAX. SYSTOLIC BP: 148 MMHG
PROTOCOL NAME: NORMAL
REASON FOR TERMINATION: NORMAL
TARGET HR FORMULA: NORMAL
TEST INDICATION: NORMAL
TIME IN EXERCISE PHASE: NORMAL

## 2023-07-27 ENCOUNTER — TELEPHONE (OUTPATIENT)
Dept: PODIATRY | Facility: CLINIC | Age: 58
End: 2023-07-27

## 2023-07-27 NOTE — TELEPHONE ENCOUNTER
Left message for patient to reschedule missed appointment on 4/10 concerning her swollen/discolored right foot.     7/27 LB

## 2023-07-28 ENCOUNTER — OFFICE VISIT (OUTPATIENT)
Dept: CARDIOLOGY CLINIC | Facility: CLINIC | Age: 58
End: 2023-07-28
Payer: MEDICARE

## 2023-07-28 VITALS
BODY MASS INDEX: 28.83 KG/M2 | DIASTOLIC BLOOD PRESSURE: 78 MMHG | WEIGHT: 189.6 LBS | HEART RATE: 80 BPM | SYSTOLIC BLOOD PRESSURE: 138 MMHG

## 2023-07-28 DIAGNOSIS — I10 PRIMARY HYPERTENSION: ICD-10-CM

## 2023-07-28 DIAGNOSIS — E78.2 MIXED HYPERLIPIDEMIA: ICD-10-CM

## 2023-07-28 DIAGNOSIS — R09.89 LEFT CAROTID BRUIT: ICD-10-CM

## 2023-07-28 DIAGNOSIS — I77.1 STENOSIS OF LEFT SUBCLAVIAN ARTERY (HCC): ICD-10-CM

## 2023-07-28 DIAGNOSIS — I25.2 MI, OLD: ICD-10-CM

## 2023-07-28 DIAGNOSIS — E11.9 TYPE 2 DIABETES MELLITUS WITHOUT COMPLICATION, WITHOUT LONG-TERM CURRENT USE OF INSULIN (HCC): ICD-10-CM

## 2023-07-28 DIAGNOSIS — I25.118 CORONARY ARTERY DISEASE OF NATIVE ARTERY OF NATIVE HEART WITH STABLE ANGINA PECTORIS (HCC): Primary | ICD-10-CM

## 2023-07-28 PROCEDURE — 99215 OFFICE O/P EST HI 40 MIN: CPT | Performed by: INTERNAL MEDICINE

## 2023-07-28 RX ORDER — SODIUM CHLORIDE 9 MG/ML
125 INJECTION, SOLUTION INTRAVENOUS CONTINUOUS
OUTPATIENT
Start: 2023-07-28

## 2023-07-28 NOTE — H&P (VIEW-ONLY)
[facility]  [address], [address] [place] [address]  Phone#  [phone]   Fax#  [phone]  *-*-*-*-*-*-*-*-*-*-*-*-*-*-*-*-*-*-*-*-*-*-*-*-*-*-*-*-*-*-*-*-*-*-*-*-*-*-*-*-*-*-*-*-*-*-*-*-*-*-*-*-*-*                                   Cardiology Follow Up      ENCOUNTER DATE: 23 4:17 PM  PATIENT NAME: Juliane Clemons   : 1965    MRN: [id number]  AGE:57 y.o. SEX: female  [address] [facility] MD [place]     PRIMARY CARE PHYSICIAN: Everardo Garcia MD    ACTIVE DIAGNOSIS THIS VISIT  1. Coronary artery disease of native artery of native heart with stable angina pectoris (HCC)  CBC and differential    Basic metabolic panel    Protime-INR    Case Request Cath Lab: Cardiac Coronary Angiogram    Case Request Cath Lab: Cardiac Coronary Angiogram      2. Mixed hyperlipidemia        3. MI, old  Case Request Cath Lab: Cardiac Coronary Angiogram    Case Request Cath Lab: Cardiac Coronary Angiogram      4. Left carotid bruit        5. Primary hypertension        6. Stenosis of left subclavian artery (HCC)        7.  Type 2 diabetes mellitus without complication, without long-term current use of insulin [facility])  Case Request Cath Lab: Cardiac Coronary Angiogram    Case Request Cath Lab: Cardiac Coronary Angiogram        ACTIVE PROBLEM LIST  Patient Active Problem List   Diagnosis   • Type 2 diabetes mellitus without complication, without long-term current use of insulin (HCC)   • Coronary artery disease involving native heart   • Primary hypertension   • Esophageal reflux   • Right foot pain   • H/O foot surgery   • Chronic pain of right ankle   • Peroneal tendonitis of right lower extremity   • Left carotid bruit   • Stenosis of left subclavian artery (HCC)   • Cardiac murmur   • Other dysphagia   • Unintentional weight loss   • Black stools   • Hyperlipidemia   • Allergic rhinitis   • MI, old       CARDIOLOGY SPECIALTY COMMENTS  Patient referred by Everardo Garcia MD for evaluation of primary hypertension and coronary artery disease. Patient first seen on 3/23/2023  -  Patient is a 64-year-old female who was sent for evaluation of coronary artery disease and hypertension. History is very limited and the patient has no records. -  She allegedly had a heart attack approximately 30 years ago in [facility] in the [place]. Tried to access [facility]'s records through care everywhere but the patient was not recognized. She was in the hospital approximately 7 days. She did not have a cardiac catheterization. She knows absolutely no details. She has a cardiologist in [facility] but has no records from him. She has never had a treadmill stress test or an echocardiogram performed. She has not taken sublingual nitroglycerin. -  She is chest discomfort which is substernal and radiates into her left arm. It can last from seconds to an entire morning. It usually lasts approximately 5 to 15 minutes. It occurs at rest but also awakens her from her sleep. It also occurs when she carries something heavy. It is accompanied at times with nausea and a cold sweat. -  She has a history of hypertension. She states that she has frequent headaches which she associates with the chest discomfort. 5/9/2023 stress test: Patient only able to achieve 61% MPHR due to calf discomfort walking on the treadmill. Stress test negative at markedly sob maximal activity    5/10/2023 less than 50% bilateral carotid artery stenosis    5/25/2023 echocardiogram: Mildly reduced to low normal systolic function at 77%. Mild global hypokinesis. Grade 1 diastolic dysfunction. Mild AR.    7/17/2023 pharmacologic nuclear stress test: Stress ECG negative for ischemia. LVEF 48% perfusion scan suggest possible anterior wall ischemia    INTERVAL HISTORY:        Patient is an extremely difficult historian and the language barrier does not help.   Today she states that she has not been having very much chest discomfort but that she has been having palpitations. She relates palpitations to anxiety. The anxiety is related to an uncomfortable feeling in her chest.  With the vagueness of her symptoms, history of an alleged heart attack in the [facility] and a stress test suggesting the possibility of anterior wall ischemia, I feel a cardiac catheterization is appropriate    I discussed the procedure with the patient. How it was performed. Risks involved and potential outcomes including finding normal coronary arteries, finding problems with the arteries best treated with medications, finding problems with the artery best treated by placing stents which might be done at the same time as the initial catheterization and lastly the possibility of needing heart surgery. I answered a multitude of questions    DISCUSSION/PLAN:          · Cardiac catheterization  · CBC, nonfasting BMP and pro time/INR  · Instructed patient to not take her metformin the day prior to the procedure the day of the procedure and the day after the procedure. · Depending on patient's symptoms following cardiac catheterization, we will consider a Zio patch monitor  · Return in 2 months.       Lab Studies:    Lab Results   Component Value Date    CHOLESTEROL 273 (H) 04/03/2023     Lab Results   Component Value Date    TRIG 208 (H) 04/03/2023     Lab Results   Component Value Date    HDL 38 (L) 04/03/2023     Lab Results   Component Value Date    LDLCALC 193 (H) 04/03/2023       Lab Results   Component Value Date    HGBA1C 6.8 (A) 09/26/2022      Lab Results   Component Value Date    EGFR 71 04/03/2023    EGFR 104 04/15/2021    EGFR 91 11/27/2020    SODIUM 136 04/03/2023    SODIUM 144 04/15/2021    SODIUM 136 (L) 11/27/2020    K 4.1 04/03/2023    K 5.4 (H) 04/15/2021    K 3.8 11/27/2020     04/03/2023     (H) 04/15/2021     11/27/2020    CO2 26 04/03/2023    CO2 20 (L) 04/15/2021    CO2 25 11/27/2020    BUN 9 04/03/2023    BUN 4 (L) 04/15/2021    BUN 9 11/27/2020    CREATININE 0.90 04/03/2023    CREATININE 0.59 (L) 04/15/2021    CREATININE 0.75 11/27/2020     Lab Results   Component Value Date    WBC 10.31 (H) 04/03/2023    WBC 9.10 04/15/2021    WBC 9.20 11/27/2020    HGB 14.5 04/03/2023    HGB 14.6 04/15/2021    HGB 14.6 11/27/2020    HCT 42.3 04/03/2023    HCT 43.5 04/15/2021    HCT 42.4 11/27/2020    MCV 91 04/03/2023    MCV 93 04/15/2021    MCV 92 11/27/2020    MCH 31.1 04/03/2023    MCH 31.2 04/15/2021    MCH 31.8 11/27/2020    MCHC 34.3 04/03/2023    MCHC 33.7 04/15/2021    MCHC 34.4 11/27/2020     04/03/2023     04/15/2021     11/27/2020      Lab Results   Component Value Date    CALCIUM 9.2 04/03/2023    CALCIUM 10.2 04/15/2021    CALCIUM 9.1 11/27/2020    AST 19 04/03/2023    AST 54 (H) 04/15/2021    AST 24 11/27/2020    ALT 27 04/03/2023    ALT 28 04/15/2021    ALT 7 (L) 11/27/2020    ALKPHOS 89 04/03/2023    ALKPHOS 89 04/15/2021    ALKPHOS 69 11/27/2020     No results found for this visit on 07/28/23.       Current Outpatient Medications:   •  carvedilol (COREG) 25 mg tablet, Take 1 tablet (25 mg total) by mouth 2 (two) times a day with meals, Disp: 60 tablet, Rfl: 5  •  cetirizine (ZyrTEC) 10 mg tablet, Take 1 tablet (10 mg total) by mouth daily, Disp: 30 tablet, Rfl: 1  •  fluticasone (FLONASE) 50 mcg/act nasal spray, 1 spray into each nostril daily, Disp: 16 g, Rfl: 1  •  lisinopril (ZESTRIL) 20 mg tablet, Take 1 tablet (20 mg total) by mouth daily, Disp: 30 tablet, Rfl: 5  •  metFORMIN (GLUCOPHAGE) 1000 MG tablet, Take 1 tablet (1,000 mg total) by mouth daily with breakfast, Disp: 90 tablet, Rfl: 1  •  NIFEdipine (PROCARDIA XL) 30 mg 24 hr tablet, Take 1 tablet (30 mg total) by mouth daily, Disp: 30 tablet, Rfl: 3  •  nitroglycerin (NITROSTAT) 0.4 mg SL tablet, Place 1 tablet (0.4 mg total) under the tongue every 5 (five) minutes as needed for chest pain, Disp: 25 tablet, Rfl: 2  •  omeprazole (PriLOSEC) 40 MG capsule, TAKE 1 CAPSULE BY MOUTH 2 TIMES A DAY BEFORE MEALS., Disp: 60 capsule, Rfl: 2  •  ranolazine (RANEXA) 500 mg 12 hr tablet, Take 1 tablet (500 mg total) by mouth 2 (two) times a day, Disp: 60 tablet, Rfl: 5  •  rosuvastatin (CRESTOR) 10 MG tablet, Take 1 tablet (10 mg total) by mouth daily, Disp: 30 tablet, Rfl: 5  •  sucralfate (CARAFATE) 1 g tablet, Take 1 tablet (1 g total) by mouth 4 (four) times a day, Disp: 120 tablet, Rfl: 0  •  bisacodyl (DULCOLAX) 5 mg EC tablet, Take 4 tablets (20 mg total) by mouth once for 1 dose, Disp: 4 tablet, Rfl: 0  •  polyethylene glycol (GaviLyte-C) 4000 mL solution, Take 4,000 mL by mouth once for 1 dose, Disp: 1 mL, Rfl: 0  No Known Allergies    Past Medical History:   Diagnosis Date   • Abnormal Pap smear of cervix     1/2023 NILM pap/+ HPV18   • Coronary artery disease    • Depression    • Diabetes mellitus ([address])    • GERD (gastroesophageal reflux disease)    • Hyperlipidemia    • Hypertension      Social History     Socioeconomic History   • Marital status:       Spouse name: Not on file   • Number of children: 1   • Years of education: Not on file   • Highest education level: Some college, no degree   Occupational History   • Occupation: Works in a [employer]   Tobacco Use   • Smoking status: Every Day     Packs/day: 0.25     Types: Cigarettes   • Smokeless tobacco: Never   Vaping Use   • Vaping Use: Never used   Substance and Sexual Activity   • Alcohol use: Yes     Comment: couple times/year   • Drug use: Never   • Sexual activity: Not Currently     Partners: Male     Birth control/protection: Female Sterilization, Post-menopausal   Other Topics Concern   • Not on file   Social History Narrative   • Not on file     Social Determinants of Health     Financial Resource Strain: Low Risk  (1/24/2023)    Overall Financial Resource Strain (CARDIA)    • Difficulty of Paying Living Expenses: Not very hard   Food Insecurity: No Food Insecurity (1/24/2023)    Hunger Vital Sign    • Worried About Running Out of Food in the Last Year: Never true    • Ran Out of Food in the Last Year: Never true   Transportation Needs: No Transportation Needs (1/24/2023)    PRAPARE - Transportation    • Lack of Transportation (Medical): No    • Lack of Transportation (Non-Medical): No   Physical Activity: Not on file   Stress: Not on file   Social Connections: Not on file   Intimate Partner Violence: Not on file   Housing Stability: Not on file      Family History   Problem Relation Age of Onset   • Cancer Brother    • Breast cancer Neg Hx    • Colon cancer Neg Hx    • Ovarian cancer Neg Hx      Past Surgical History:   Procedure Laterality Date   • ABDOMINOPLASTY  2001   • AUGMENTATION BREAST Bilateral 2001   • BREAST BIOPSY Left 2016    benign   • FOOT FRACTURE SURGERY Right 2013   • LIPOSUCTION TRUNK  2001   • TUBAL LIGATION  1997       PREVIOUS WEIGHTS:   Wt Readings from Last 10 Encounters:   07/28/23 86 kg (189 lb 9.6 oz)   07/17/23 86.2 kg (190 lb)   06/26/23 86.5 kg (190 lb 12.8 oz)   05/25/23 86.6 kg (191 lb)   05/09/23 86.6 kg (191 lb)   04/13/23 86.6 kg (191 lb)   04/11/23 86.6 kg (191 lb)   04/03/23 87.5 kg (192 lb 12.8 oz)   03/23/23 87.5 kg (193 lb)   02/27/23 85.3 kg (188 lb)        Review of Systems:  Review of Systems   Respiratory: Negative for cough, choking, chest tightness, shortness of breath and wheezing. Cardiovascular: Negative for chest pain, palpitations and leg swelling. Musculoskeletal: Negative for gait problem. Skin: Negative for rash. Neurological: Negative for dizziness, tremors, syncope, weakness, light-headedness, numbness and headaches. Psychiatric/Behavioral: Negative for agitation and behavioral problems. The patient is not hyperactive.         Physical Exam:  /78 (BP Location: Right arm, Patient Position: Sitting, Cuff Size: Adult)   Pulse 80   Wt 86 kg (189 lb 9.6 oz)   BMI 28.83 kg/m² Physical Exam  Constitutional:       General: She is not in acute distress. Appearance: She is well-developed. HENT:      Head: Normocephalic and atraumatic. Neck:      Thyroid: No thyromegaly. Vascular: No carotid bruit or JVD. Trachea: No tracheal deviation. Cardiovascular:      Rate and Rhythm: Normal rate and regular rhythm. Pulses: Normal pulses. Heart sounds: Normal heart sounds. No murmur heard. No friction rub. No gallop. Pulmonary:      Effort: Pulmonary effort is normal. No respiratory distress. Breath sounds: Normal breath sounds. No wheezing, rhonchi or rales. Chest:      Chest wall: No tenderness. Abdominal:      Palpations: Abdomen is soft. Musculoskeletal:         General: Normal range of motion. Cervical back: Normal range of motion and neck supple. Right lower leg: No edema. Left lower leg: No edema. Skin:     General: Skin is warm and dry. Neurological:      General: No focal deficit present. Mental Status: She is alert and oriented to person, place, and time. Psychiatric:         Mood and Affect: Mood normal.         Behavior: Behavior normal.         Thought Content: Thought content normal.         Judgment: Judgment normal.         ======================================================  Imaging:   I have personally reviewed pertinent reports. I spent 50 minutes on the patient's office visit. This time was spent on the day of the visit. I had direct contact with the patient in the office on the day of the visit. Greater than 50% of the total time was spent obtaining a history, examining patient, answering all patient questions, arranging and discussing plan of care with patient as well as directly providing instructions. Additional time then spent on orders and office chart. Portions of the record may have been created with voice recognition software.  Occasional wrong word or "sound a like" substitutions may have occurred due to the inherent limitations of voice recognition software. Read the chart carefully and recognize, using context, where substitutions have occurred.     SIGNATURES:   Rossi Dunlap MD

## 2023-07-28 NOTE — PROGRESS NOTES
[facility]  [address] [facility] [address], [address] [place] [id number]  Phone#  [phone]   Fax#  [phone]  *-*-*-*-*-*-*-*-*-*-*-*-*-*-*-*-*-*-*-*-*-*-*-*-*-*-*-*-*-*-*-*-*-*-*-*-*-*-*-*-*-*-*-*-*-*-*-*-*-*-*-*-*-*                                   Cardiology Follow Up      ENCOUNTER DATE: 23 4:17 PM  PATIENT NAME: Oneil Mclaughlin   : 1965    MRN: [id number]  AGE:57 y.o. SEX: female  [facility] MD [place]     PRIMARY CARE PHYSICIAN: Seth Jaramillo MD    ACTIVE DIAGNOSIS THIS VISIT  1. Coronary artery disease of native artery of native heart with stable angina pectoris (HCC)  CBC and differential    Basic metabolic panel    Protime-INR    Case Request Cath Lab: Cardiac Coronary Angiogram    Case Request Cath Lab: Cardiac Coronary Angiogram      2. Mixed hyperlipidemia        3. MI, old  Case Request Cath Lab: Cardiac Coronary Angiogram    Case Request Cath Lab: Cardiac Coronary Angiogram      4. Left carotid bruit        5. Primary hypertension        6. Stenosis of left subclavian artery (HCC)        7.  Type 2 diabetes mellitus without complication, without long-term current use of insulin [facility])  Case Request Cath Lab: Cardiac Coronary Angiogram    Case Request Cath Lab: Cardiac Coronary Angiogram        ACTIVE PROBLEM LIST  Patient Active Problem List   Diagnosis   • Type 2 diabetes mellitus without complication, without long-term current use of insulin (HCC)   • Coronary artery disease involving native heart   • Primary hypertension   • Esophageal reflux   • Right foot pain   • H/O foot surgery   • Chronic pain of right ankle   • Peroneal tendonitis of right lower extremity   • Left carotid bruit   • Stenosis of left subclavian artery (HCC)   • Cardiac murmur   • Other dysphagia   • Unintentional weight loss   • Black stools   • Hyperlipidemia   • Allergic rhinitis   • MI, old       CARDIOLOGY SPECIALTY COMMENTS  Patient referred by Seth Jaramillo MD for evaluation of primary hypertension and coronary artery disease. Patient first seen on 3/23/2023  -  Patient is a 52-year-old female who was sent for evaluation of coronary artery disease and hypertension. History is very limited and the patient has no records. -  She allegedly had a heart attack approximately 30 years ago in [facility] in the [place]. Tried to access [facility]'s records through care everywhere but the patient was not recognized. She was in the hospital approximately 7 days. She did not have a cardiac catheterization. She knows absolutely no details. She has a cardiologist in [facility] but has no records from him. She has never had a treadmill stress test or an echocardiogram performed. She has not taken sublingual nitroglycerin. -  She is chest discomfort which is substernal and radiates into her left arm. It can last from seconds to an entire morning. It usually lasts approximately 5 to 15 minutes. It occurs at rest but also awakens her from her sleep. It also occurs when she carries something heavy. It is accompanied at times with nausea and a cold sweat. -  She has a history of hypertension. She states that she has frequent headaches which she associates with the chest discomfort. 5/9/2023 stress test: Patient only able to achieve 61% MPHR due to calf discomfort walking on the treadmill. Stress test negative at markedly sob maximal activity    5/10/2023 less than 50% bilateral carotid artery stenosis    5/25/2023 echocardiogram: Mildly reduced to low normal systolic function at 20%. Mild global hypokinesis. Grade 1 diastolic dysfunction. Mild AR.    7/17/2023 pharmacologic nuclear stress test: Stress ECG negative for ischemia. LVEF 48% perfusion scan suggest possible anterior wall ischemia    INTERVAL HISTORY:        Patient is an extremely difficult historian and the language barrier does not help.   Today she states that she has not been having very much chest discomfort but that she has been having palpitations. She relates palpitations to anxiety. The anxiety is related to an uncomfortable feeling in her chest.  With the vagueness of her symptoms, history of an alleged heart attack in the [facility] and a stress test suggesting the possibility of anterior wall ischemia, I feel a cardiac catheterization is appropriate    I discussed the procedure with the patient. How it was performed. Risks involved and potential outcomes including finding normal coronary arteries, finding problems with the arteries best treated with medications, finding problems with the artery best treated by placing stents which might be done at the same time as the initial catheterization and lastly the possibility of needing heart surgery. I answered a multitude of questions    DISCUSSION/PLAN:          · Cardiac catheterization  · CBC, nonfasting BMP and pro time/INR  · Instructed patient to not take her metformin the day prior to the procedure the day of the procedure and the day after the procedure. · Depending on patient's symptoms following cardiac catheterization, we will consider a Zio patch monitor  · Return in 2 months.       Lab Studies:    Lab Results   Component Value Date    CHOLESTEROL 273 (H) 04/03/2023     Lab Results   Component Value Date    TRIG 208 (H) 04/03/2023     Lab Results   Component Value Date    HDL 38 (L) 04/03/2023     Lab Results   Component Value Date    LDLCALC 193 (H) 04/03/2023       Lab Results   Component Value Date    HGBA1C 6.8 (A) 09/26/2022      Lab Results   Component Value Date    EGFR 71 04/03/2023    EGFR 104 04/15/2021    EGFR 91 11/27/2020    SODIUM 136 04/03/2023    SODIUM 144 04/15/2021    SODIUM 136 (L) 11/27/2020    K 4.1 04/03/2023    K 5.4 (H) 04/15/2021    K 3.8 11/27/2020     04/03/2023     (H) 04/15/2021     11/27/2020    CO2 26 04/03/2023    CO2 20 (L) 04/15/2021    CO2 25 11/27/2020    BUN 9 04/03/2023    BUN 4 (L) 04/15/2021    BUN 9 11/27/2020    CREATININE 0.90 04/03/2023    CREATININE 0.59 (L) 04/15/2021    CREATININE 0.75 11/27/2020     Lab Results   Component Value Date    WBC 10.31 (H) 04/03/2023    WBC 9.10 04/15/2021    WBC 9.20 11/27/2020    HGB 14.5 04/03/2023    HGB 14.6 04/15/2021    HGB 14.6 11/27/2020    HCT 42.3 04/03/2023    HCT 43.5 04/15/2021    HCT 42.4 11/27/2020    MCV 91 04/03/2023    MCV 93 04/15/2021    MCV 92 11/27/2020    MCH 31.1 04/03/2023    MCH 31.2 04/15/2021    MCH 31.8 11/27/2020    MCHC 34.3 04/03/2023    MCHC 33.7 04/15/2021    MCHC 34.4 11/27/2020     04/03/2023     04/15/2021     11/27/2020      Lab Results   Component Value Date    CALCIUM 9.2 04/03/2023    CALCIUM 10.2 04/15/2021    CALCIUM 9.1 11/27/2020    AST 19 04/03/2023    AST 54 (H) 04/15/2021    AST 24 11/27/2020    ALT 27 04/03/2023    ALT 28 04/15/2021    ALT 7 (L) 11/27/2020    ALKPHOS 89 04/03/2023    ALKPHOS 89 04/15/2021    ALKPHOS 69 11/27/2020     No results found for this visit on 07/28/23.       Current Outpatient Medications:   •  carvedilol (COREG) 25 mg tablet, Take 1 tablet (25 mg total) by mouth 2 (two) times a day with meals, Disp: 60 tablet, Rfl: 5  •  cetirizine (ZyrTEC) 10 mg tablet, Take 1 tablet (10 mg total) by mouth daily, Disp: 30 tablet, Rfl: 1  •  fluticasone (FLONASE) 50 mcg/act nasal spray, 1 spray into each nostril daily, Disp: 16 g, Rfl: 1  •  lisinopril (ZESTRIL) 20 mg tablet, Take 1 tablet (20 mg total) by mouth daily, Disp: 30 tablet, Rfl: 5  •  metFORMIN (GLUCOPHAGE) 1000 MG tablet, Take 1 tablet (1,000 mg total) by mouth daily with breakfast, Disp: 90 tablet, Rfl: 1  •  NIFEdipine (PROCARDIA XL) 30 mg 24 hr tablet, Take 1 tablet (30 mg total) by mouth daily, Disp: 30 tablet, Rfl: 3  •  nitroglycerin (NITROSTAT) 0.4 mg SL tablet, Place 1 tablet (0.4 mg total) under the tongue every 5 (five) minutes as needed for chest pain, Disp: 25 tablet, Rfl: 2  •  omeprazole (PriLOSEC) 40 MG capsule, TAKE 1 CAPSULE BY MOUTH 2 TIMES A DAY BEFORE MEALS., Disp: 60 capsule, Rfl: 2  •  ranolazine (RANEXA) 500 mg 12 hr tablet, Take 1 tablet (500 mg total) by mouth 2 (two) times a day, Disp: 60 tablet, Rfl: 5  •  rosuvastatin (CRESTOR) 10 MG tablet, Take 1 tablet (10 mg total) by mouth daily, Disp: 30 tablet, Rfl: 5  •  sucralfate (CARAFATE) 1 g tablet, Take 1 tablet (1 g total) by mouth 4 (four) times a day, Disp: 120 tablet, Rfl: 0  •  bisacodyl (DULCOLAX) 5 mg EC tablet, Take 4 tablets (20 mg total) by mouth once for 1 dose, Disp: 4 tablet, Rfl: 0  •  polyethylene glycol (GaviLyte-C) 4000 mL solution, Take 4,000 mL by mouth once for 1 dose, Disp: 1 mL, Rfl: 0  No Known Allergies    Past Medical History:   Diagnosis Date   • Abnormal Pap smear of cervix     1/2023 NILM pap/+ HPV18   • Coronary artery disease    • Depression    • Diabetes mellitus ([address])    • GERD (gastroesophageal reflux disease)    • Hyperlipidemia    • Hypertension      Social History     Socioeconomic History   • Marital status:       Spouse name: Not on file   • Number of children: 1   • Years of education: Not on file   • Highest education level: Some college, no degree   Occupational History   • Occupation: Works in a [employer]   Tobacco Use   • Smoking status: Every Day     Packs/day: 0.25     Types: Cigarettes   • Smokeless tobacco: Never   Vaping Use   • Vaping Use: Never used   Substance and Sexual Activity   • Alcohol use: Yes     Comment: couple times/year   • Drug use: Never   • Sexual activity: Not Currently     Partners: Male     Birth control/protection: Female Sterilization, Post-menopausal   Other Topics Concern   • Not on file   Social History Narrative   • Not on file     Social Determinants of Health     Financial Resource Strain: Low Risk  (1/24/2023)    Overall Financial Resource Strain (CARDIA)    • Difficulty of Paying Living Expenses: Not very hard   Food Insecurity: No Food Insecurity (1/24/2023)    Hunger Vital Sign    • Worried About Running Out of Food in the Last Year: Never true    • Ran Out of Food in the Last Year: Never true   Transportation Needs: No Transportation Needs (1/24/2023)    PRAPARE - Transportation    • Lack of Transportation (Medical): No    • Lack of Transportation (Non-Medical): No   Physical Activity: Not on file   Stress: Not on file   Social Connections: Not on file   Intimate Partner Violence: Not on file   Housing Stability: Not on file      Family History   Problem Relation Age of Onset   • Cancer Brother    • Breast cancer Neg Hx    • Colon cancer Neg Hx    • Ovarian cancer Neg Hx      Past Surgical History:   Procedure Laterality Date   • ABDOMINOPLASTY  2001   • AUGMENTATION BREAST Bilateral 2001   • BREAST BIOPSY Left 2016    benign   • FOOT FRACTURE SURGERY Right 2013   • LIPOSUCTION TRUNK  2001   • TUBAL LIGATION  1997       PREVIOUS WEIGHTS:   Wt Readings from Last 10 Encounters:   07/28/23 86 kg (189 lb 9.6 oz)   07/17/23 86.2 kg (190 lb)   06/26/23 86.5 kg (190 lb 12.8 oz)   05/25/23 86.6 kg (191 lb)   05/09/23 86.6 kg (191 lb)   04/13/23 86.6 kg (191 lb)   04/11/23 86.6 kg (191 lb)   04/03/23 87.5 kg (192 lb 12.8 oz)   03/23/23 87.5 kg (193 lb)   02/27/23 85.3 kg (188 lb)        Review of Systems:  Review of Systems   Respiratory: Negative for cough, choking, chest tightness, shortness of breath and wheezing. Cardiovascular: Negative for chest pain, palpitations and leg swelling. Musculoskeletal: Negative for gait problem. Skin: Negative for rash. Neurological: Negative for dizziness, tremors, syncope, weakness, light-headedness, numbness and headaches. Psychiatric/Behavioral: Negative for agitation and behavioral problems. The patient is not hyperactive.         Physical Exam:  /78 (BP Location: Right arm, Patient Position: Sitting, Cuff Size: Adult)   Pulse 80   Wt 86 kg (189 lb 9.6 oz)   BMI 28.83 kg/m² Physical Exam  Constitutional:       General: She is not in acute distress. Appearance: She is well-developed. HENT:      Head: Normocephalic and atraumatic. Neck:      Thyroid: No thyromegaly. Vascular: No carotid bruit or JVD. Trachea: No tracheal deviation. Cardiovascular:      Rate and Rhythm: Normal rate and regular rhythm. Pulses: Normal pulses. Heart sounds: Normal heart sounds. No murmur heard. No friction rub. No gallop. Pulmonary:      Effort: Pulmonary effort is normal. No respiratory distress. Breath sounds: Normal breath sounds. No wheezing, rhonchi or rales. Chest:      Chest wall: No tenderness. Abdominal:      Palpations: Abdomen is soft. Musculoskeletal:         General: Normal range of motion. Cervical back: Normal range of motion and neck supple. Right lower leg: No edema. Left lower leg: No edema. Skin:     General: Skin is warm and dry. Neurological:      General: No focal deficit present. Mental Status: She is alert and oriented to person, place, and time. Psychiatric:         Mood and Affect: Mood normal.         Behavior: Behavior normal.         Thought Content: Thought content normal.         Judgment: Judgment normal.         ======================================================  Imaging:   I have personally reviewed pertinent reports. I spent 50 minutes on the patient's office visit. This time was spent on the day of the visit. I had direct contact with the patient in the office on the day of the visit. Greater than 50% of the total time was spent obtaining a history, examining patient, answering all patient questions, arranging and discussing plan of care with patient as well as directly providing instructions. Additional time then spent on orders and office chart. Portions of the record may have been created with voice recognition software.  Occasional wrong word or "sound a like" substitutions may have occurred due to the inherent limitations of voice recognition software. Read the chart carefully and recognize, using context, where substitutions have occurred.     SIGNATURES:   López Landis MD

## 2023-07-31 ENCOUNTER — TELEPHONE (OUTPATIENT)
Dept: CARDIOLOGY CLINIC | Facility: CLINIC | Age: 58
End: 2023-07-31

## 2023-07-31 NOTE — TELEPHONE ENCOUNTER
Progress Notes by Veronica Tolbert MD 7/28/2023  Signed    DISCUSSION/PLAN:     · Cardiac catheterization  · CBC, nonfasting BMP and pro time/INR  · Instructed patient to not take her metformin the day prior to the procedure the day of the procedure and the day after the procedure. · Depending on patient's symptoms following cardiac catheterization, we will consider a Zio patch monitor  · Return in 2 months.

## 2023-07-31 NOTE — TELEPHONE ENCOUNTER
Left voicemail on machine informing patient to call and schedule a Left Heart Cath procedure.      Thanks,  Wilda and Zulay

## 2023-08-01 ENCOUNTER — APPOINTMENT (OUTPATIENT)
Dept: LAB | Facility: CLINIC | Age: 58
End: 2023-08-01
Payer: MEDICARE

## 2023-08-01 ENCOUNTER — OFFICE VISIT (OUTPATIENT)
Dept: FAMILY MEDICINE CLINIC | Facility: CLINIC | Age: 58
End: 2023-08-01

## 2023-08-01 VITALS
HEIGHT: 68 IN | HEART RATE: 78 BPM | TEMPERATURE: 97.7 F | OXYGEN SATURATION: 99 % | WEIGHT: 189 LBS | DIASTOLIC BLOOD PRESSURE: 74 MMHG | SYSTOLIC BLOOD PRESSURE: 126 MMHG | RESPIRATION RATE: 18 BRPM | BODY MASS INDEX: 28.64 KG/M2

## 2023-08-01 DIAGNOSIS — G43.909 MIGRAINE WITHOUT STATUS MIGRAINOSUS, NOT INTRACTABLE, UNSPECIFIED MIGRAINE TYPE: Primary | ICD-10-CM

## 2023-08-01 DIAGNOSIS — I25.118 CORONARY ARTERY DISEASE OF NATIVE ARTERY OF NATIVE HEART WITH STABLE ANGINA PECTORIS (HCC): ICD-10-CM

## 2023-08-01 DIAGNOSIS — I10 PRIMARY HYPERTENSION: ICD-10-CM

## 2023-08-01 LAB
ANION GAP SERPL CALCULATED.3IONS-SCNC: 4 MMOL/L
BASOPHILS # BLD MANUAL: 0 THOUSAND/UL (ref 0–0.1)
BASOPHILS NFR MAR MANUAL: 0 % (ref 0–1)
BUN SERPL-MCNC: 11 MG/DL (ref 5–25)
CALCIUM SERPL-MCNC: 9.7 MG/DL (ref 8.3–10.1)
CHLORIDE SERPL-SCNC: 105 MMOL/L (ref 96–108)
CO2 SERPL-SCNC: 27 MMOL/L (ref 21–32)
CREAT SERPL-MCNC: 0.96 MG/DL (ref 0.6–1.3)
EOSINOPHIL # BLD MANUAL: 0.25 THOUSAND/UL (ref 0–0.4)
EOSINOPHIL NFR BLD MANUAL: 2 % (ref 0–6)
ERYTHROCYTE [DISTWIDTH] IN BLOOD BY AUTOMATED COUNT: 13.7 % (ref 11.6–15.1)
GFR SERPL CREATININE-BSD FRML MDRD: 66 ML/MIN/1.73SQ M
GLUCOSE P FAST SERPL-MCNC: 258 MG/DL (ref 65–99)
HCT VFR BLD AUTO: 43.4 % (ref 34.8–46.1)
HGB BLD-MCNC: 14.3 G/DL (ref 11.5–15.4)
INR PPP: 0.92 (ref 0.84–1.19)
LG PLATELETS BLD QL SMEAR: PRESENT
LYMPHOCYTES # BLD AUTO: 44 % (ref 14–44)
LYMPHOCYTES # BLD AUTO: 5.73 THOUSAND/UL (ref 0.6–4.47)
MCH RBC QN AUTO: 30.5 PG (ref 26.8–34.3)
MCHC RBC AUTO-ENTMCNC: 32.9 G/DL (ref 31.4–37.4)
MCV RBC AUTO: 93 FL (ref 82–98)
MONOCYTES # BLD AUTO: 0.75 THOUSAND/UL (ref 0–1.22)
MONOCYTES NFR BLD: 6 % (ref 4–12)
NEUTROPHILS # BLD MANUAL: 5.73 THOUSAND/UL (ref 1.85–7.62)
NEUTS SEG NFR BLD AUTO: 46 % (ref 43–75)
PLATELET # BLD AUTO: 320 THOUSANDS/UL (ref 149–390)
PLATELET BLD QL SMEAR: ADEQUATE
PMV BLD AUTO: 11.6 FL (ref 8.9–12.7)
POTASSIUM SERPL-SCNC: 4 MMOL/L (ref 3.5–5.3)
PROTHROMBIN TIME: 12.6 SECONDS (ref 11.6–14.5)
RBC # BLD AUTO: 4.69 MILLION/UL (ref 3.81–5.12)
RBC MORPH BLD: NORMAL
SODIUM SERPL-SCNC: 136 MMOL/L (ref 135–147)
VARIANT LYMPHS # BLD AUTO: 2 %
WBC # BLD AUTO: 12.46 THOUSAND/UL (ref 4.31–10.16)

## 2023-08-01 PROCEDURE — 99214 OFFICE O/P EST MOD 30 MIN: CPT

## 2023-08-01 PROCEDURE — 85610 PROTHROMBIN TIME: CPT

## 2023-08-01 PROCEDURE — 80048 BASIC METABOLIC PNL TOTAL CA: CPT

## 2023-08-01 PROCEDURE — 85007 BL SMEAR W/DIFF WBC COUNT: CPT

## 2023-08-01 PROCEDURE — 36415 COLL VENOUS BLD VENIPUNCTURE: CPT

## 2023-08-01 PROCEDURE — 85027 COMPLETE CBC AUTOMATED: CPT

## 2023-08-01 RX ORDER — AMITRIPTYLINE HYDROCHLORIDE 10 MG/1
10 TABLET, FILM COATED ORAL
Qty: 60 TABLET | Refills: 0 | Status: ON HOLD | OUTPATIENT
Start: 2023-08-01 | End: 2023-09-30

## 2023-08-01 NOTE — PROGRESS NOTES
Name: Vladimir Davalos      : 1965      MRN: [id number]  Encounter Provider: JOSHUA Whittington  Encounter Date: 2023   Encounter department: [address] [facility]     1. Migraine without status migrainosus, not intractable, unspecified migraine type  Assessment & Plan:  Denies headache at the time of the visit, hx of unilateral headaches for over 6 months   No current red flags such as sudden onset headache, focal exam findings, stiff neck, temperature, or altered mental status. Discussed avoiding triggers, headache diary and importance of staying well hydrated and regular exercise. Start elavil 10 mg daily   Discussed side effects and treatment expectations. Follow up in 8 weeks     Orders:  -     amitriptyline (ELAVIL) 10 mg tablet; Take 1 tablet (10 mg total) by mouth daily at bedtime    2. Primary hypertension  Assessment & Plan:  BP Readings from Last 3 Encounters:   23 126/74   23 138/78   23 110/70     -pt reports not taking all medications as prescribed as she feels it's too many medications  - Blood pressure currently well controlled with current antihypertensive therapy  - No complaints of adverse effects, including visual changes, dizziness, headaches or syncope. - Will continue current therapy: carvedilol 25 mg bid, lisinopril 20 mg daily encouraged treatment compliance  - Encouraged continued use of home blood pressure logs. - Encouraged diet and exercise regimen as tolerated. Subjective      Georgiana Rudd 65 y.o. female  has a past medical history of Abnormal Pap smear of cervix, Coronary artery disease, Depression, Diabetes mellitus ([address]), GERD (gastroesophageal reflux disease), Hyperlipidemia, and Hypertension. Presenting today headache. Patient reports symptoms have been present for over 6 months. Headache preset on left frontal/temporal region.  No headaches at the time of the visit; usually relieved with tylenol and/or ibuprofen. Denies fatigue, headaches, dizziness, blurred vision, nausea, palpitation, chest pain, SOB, urinary changes, weakness, bowel changes, sleep problems,  sick contacts, red flag signs,  or recent travel. Overall patient reports feeling well.  Patient has no further complaints other than what is mentioned in the ROS. Migraine  This is a chronic problem. The current episode started more than 1 month ago. The problem has been waxing and waning since onset. The pain is present in the left unilateral. The pain does not radiate. The pain quality is similar to prior headaches. The quality of the pain is described as aching and throbbing. The pain is at a severity of 0/10. She is experiencing no pain. Associated symptoms include blurred vision, dizziness, eye watering and insomnia. Pertinent negatives include no abdominal pain, back pain, coughing, ear pain, eye pain, fever, hearing loss, neck pain, numbness, phonophobia, photophobia, rhinorrhea, seizures, sore throat, tinnitus or vomiting. Exacerbated by: unknown  Past treatments include acetaminophen and NSAIDs. The treatment provided no relief. Hypertension  This is a chronic problem. The current episode started more than 1 year ago. The problem has been waxing and waning since onset. The problem is controlled. Associated symptoms include blurred vision and headaches. Pertinent negatives include no chest pain, malaise/fatigue, neck pain, palpitations, peripheral edema or shortness of breath. Agents associated with hypertension include NSAIDs. Risk factors for coronary artery disease include diabetes mellitus, dyslipidemia, obesity, post-menopausal state, sedentary lifestyle and stress. Past treatments include beta blockers, ACE inhibitors and calcium channel blockers. The current treatment provides significant improvement. Compliance problems: quantaty of medications.       Review of Systems   Constitutional: Negative for chills, fever and malaise/fatigue. HENT: Negative for ear pain, hearing loss, rhinorrhea, sore throat and tinnitus. Eyes: Positive for blurred vision. Negative for photophobia, pain and visual disturbance. Respiratory: Negative for cough and shortness of breath. Cardiovascular: Negative for chest pain and palpitations. Gastrointestinal: Negative for abdominal pain and vomiting. Genitourinary: Negative for dysuria and hematuria. Musculoskeletal: Negative for arthralgias, back pain and neck pain. Skin: Negative for color change and rash. Neurological: Positive for dizziness and headaches. Negative for seizures, syncope and numbness. Psychiatric/Behavioral: The patient has insomnia. All other systems reviewed and are negative. Current Outpatient Medications on File Prior to Visit   Medication Sig   • carvedilol (COREG) 25 mg tablet Take 1 tablet (25 mg total) by mouth 2 (two) times a day with meals   • cetirizine (ZyrTEC) 10 mg tablet Take 1 tablet (10 mg total) by mouth daily   • fluticasone (FLONASE) 50 mcg/act nasal spray 1 spray into each nostril daily   • lisinopril (ZESTRIL) 20 mg tablet Take 1 tablet (20 mg total) by mouth daily   • metFORMIN (GLUCOPHAGE) 1000 MG tablet Take 1 tablet (1,000 mg total) by mouth daily with breakfast   • NIFEdipine (PROCARDIA XL) 30 mg 24 hr tablet Take 1 tablet (30 mg total) by mouth daily   • nitroglycerin (NITROSTAT) 0.4 mg SL tablet Place 1 tablet (0.4 mg total) under the tongue every 5 (five) minutes as needed for chest pain   • omeprazole (PriLOSEC) 40 MG capsule TAKE 1 CAPSULE BY MOUTH 2 TIMES A DAY BEFORE MEALS.    • ranolazine (RANEXA) 500 mg 12 hr tablet Take 1 tablet (500 mg total) by mouth 2 (two) times a day   • rosuvastatin (CRESTOR) 10 MG tablet Take 1 tablet (10 mg total) by mouth daily   • sucralfate (CARAFATE) 1 g tablet Take 1 tablet (1 g total) by mouth 4 (four) times a day   • [DISCONTINUED] bisacodyl (DULCOLAX) 5 mg EC tablet Take 4 tablets (20 mg total) by mouth once for 1 dose   • [DISCONTINUED] polyethylene glycol (GaviLyte-C) 4000 mL solution Take 4,000 mL by mouth once for 1 dose       Objective     /74 (BP Location: Left arm, Patient Position: Sitting, Cuff Size: Standard)   Pulse 78   Temp 97.7 °F (36.5 °C) (Temporal)   Resp 18   Ht 5' 8" (1.727 m)   Wt 85.7 kg (189 lb)   SpO2 99%   BMI 28.74 kg/m²     Physical Exam  Vitals and nursing note reviewed. Constitutional:       General: She is not in acute distress. Appearance: Normal appearance. She is not ill-appearing. HENT:      Head: Normocephalic and atraumatic. Right Ear: External ear normal.      Left Ear: External ear normal.      Nose: Nose normal.      Mouth/Throat:      Mouth: Mucous membranes are moist.   Eyes:      General:         Right eye: No discharge. Left eye: No discharge. Pupils: Pupils are equal, round, and reactive to light. Cardiovascular:      Rate and Rhythm: Normal rate and regular rhythm. Pulses: Normal pulses. Heart sounds: Normal heart sounds. Pulmonary:      Effort: Pulmonary effort is normal. No respiratory distress. Breath sounds: Normal breath sounds. No wheezing. Abdominal:      General: Bowel sounds are normal.      Palpations: Abdomen is soft. Tenderness: There is no abdominal tenderness. There is no right CVA tenderness or left CVA tenderness. Musculoskeletal:         General: Normal range of motion. Cervical back: Normal range of motion. Skin:     General: Skin is warm and dry. Neurological:      General: No focal deficit present. Mental Status: She is alert and oriented to person, place, and time. GCS: GCS eye subscore is 4. GCS verbal subscore is 5. GCS motor subscore is 6. Cranial Nerves: Cranial nerves 2-12 are intact.        JOSHUA Allen

## 2023-08-01 NOTE — ASSESSMENT & PLAN NOTE
Denies headache at the time of the visit, hx of unilateral headaches for over 6 months   No current red flags such as sudden onset headache, focal exam findings, stiff neck, temperature, or altered mental status. Discussed avoiding triggers, headache diary and importance of staying well hydrated and regular exercise. Start elavil 10 mg daily   Discussed side effects and treatment expectations.    Follow up in 8 weeks

## 2023-08-01 NOTE — TELEPHONE ENCOUNTER
Patient scheduled for Left Heart Cath on 8/14/23 at [facility] with Dr. Angelic Mtz. Instructions sent to patient through [address] [place] [address]. Patient aware of all general instructions. Medication holds:   Metformin - Do not take night before and morning of procedure. Labs to be done on 8/2/23:  BMP / CBC (fasting 8 hours)   (Labs already ordered by [employer])    Insurance: Angelina Alexander     Please obtain auth.      Thank you,  Danica Rai

## 2023-08-01 NOTE — ASSESSMENT & PLAN NOTE
BP Readings from Last 3 Encounters:   08/01/23 126/74   07/28/23 138/78   06/26/23 110/70     -pt reports not taking all medications as prescribed as she feels it's too many medications  - Blood pressure currently well controlled with current antihypertensive therapy  - No complaints of adverse effects, including visual changes, dizziness, headaches or syncope. - Will continue current therapy: carvedilol 25 mg bid, lisinopril 20 mg daily encouraged treatment compliance  - Encouraged continued use of home blood pressure logs. - Encouraged diet and exercise regimen as tolerated.

## 2023-08-12 DIAGNOSIS — I10 PRIMARY HYPERTENSION: ICD-10-CM

## 2023-08-14 ENCOUNTER — HOSPITAL ENCOUNTER (OUTPATIENT)
Facility: HOSPITAL | Age: 58
Setting detail: OUTPATIENT SURGERY
Discharge: HOME | End: 2023-08-15
Attending: INTERNAL MEDICINE | Admitting: INTERNAL MEDICINE
Payer: MEDICARE

## 2023-08-14 DIAGNOSIS — E11.9 TYPE 2 DIABETES MELLITUS WITHOUT COMPLICATION, WITHOUT LONG-TERM CURRENT USE OF INSULIN (HCC): ICD-10-CM

## 2023-08-14 DIAGNOSIS — E78.5 HYPERLIPIDEMIA: ICD-10-CM

## 2023-08-14 DIAGNOSIS — K21.00 GASTROESOPHAGEAL REFLUX DISEASE WITH ESOPHAGITIS WITHOUT HEMORRHAGE: ICD-10-CM

## 2023-08-14 DIAGNOSIS — I25.2 MI, OLD: ICD-10-CM

## 2023-08-14 DIAGNOSIS — Z95.5 S/P DRUG ELUTING CORONARY STENT PLACEMENT: Primary | ICD-10-CM

## 2023-08-14 DIAGNOSIS — I25.118 CORONARY ARTERY DISEASE OF NATIVE ARTERY OF NATIVE HEART WITH STABLE ANGINA PECTORIS (HCC): ICD-10-CM

## 2023-08-14 DIAGNOSIS — E78.2 MIXED HYPERLIPIDEMIA: ICD-10-CM

## 2023-08-14 LAB
ATRIAL RATE: 62 BPM
ATRIAL RATE: 75 BPM
GLUCOSE SERPL-MCNC: 217 MG/DL (ref 65–140)
GLUCOSE SERPL-MCNC: 231 MG/DL (ref 65–140)
KCT BLD-ACNC: 320 SEC (ref 89–137)
P AXIS: 58 DEGREES
P AXIS: 62 DEGREES
PR INTERVAL: 178 MS
PR INTERVAL: 196 MS
QRS AXIS: 36 DEGREES
QRS AXIS: 44 DEGREES
QRSD INTERVAL: 82 MS
QRSD INTERVAL: 98 MS
QT INTERVAL: 372 MS
QT INTERVAL: 420 MS
QTC INTERVAL: 415 MS
QTC INTERVAL: 426 MS
SPECIMEN SOURCE: ABNORMAL
T WAVE AXIS: 41 DEGREES
T WAVE AXIS: 54 DEGREES
VENTRICULAR RATE: 62 BPM
VENTRICULAR RATE: 75 BPM

## 2023-08-14 PROCEDURE — 85347 COAGULATION TIME ACTIVATED: CPT

## 2023-08-14 PROCEDURE — C9600 PERC DRUG-EL COR STENT SING: HCPCS | Performed by: INTERNAL MEDICINE

## 2023-08-14 PROCEDURE — 99153 MOD SED SAME PHYS/QHP EA: CPT | Performed by: INTERNAL MEDICINE

## 2023-08-14 PROCEDURE — C1769 GUIDE WIRE: HCPCS | Performed by: INTERNAL MEDICINE

## 2023-08-14 PROCEDURE — 93454 CORONARY ARTERY ANGIO S&I: CPT | Performed by: INTERNAL MEDICINE

## 2023-08-14 PROCEDURE — 93010 ELECTROCARDIOGRAM REPORT: CPT | Performed by: INTERNAL MEDICINE

## 2023-08-14 PROCEDURE — C1725 CATH, TRANSLUMIN NON-LASER: HCPCS | Performed by: INTERNAL MEDICINE

## 2023-08-14 PROCEDURE — 99152 MOD SED SAME PHYS/QHP 5/>YRS: CPT | Performed by: INTERNAL MEDICINE

## 2023-08-14 PROCEDURE — C1894 INTRO/SHEATH, NON-LASER: HCPCS | Performed by: INTERNAL MEDICINE

## 2023-08-14 PROCEDURE — C1874 STENT, COATED/COV W/DEL SYS: HCPCS | Performed by: INTERNAL MEDICINE

## 2023-08-14 PROCEDURE — 92928 PRQ TCAT PLMT NTRAC ST 1 LES: CPT | Performed by: INTERNAL MEDICINE

## 2023-08-14 PROCEDURE — C1887 CATHETER, GUIDING: HCPCS | Performed by: INTERNAL MEDICINE

## 2023-08-14 PROCEDURE — 93005 ELECTROCARDIOGRAM TRACING: CPT

## 2023-08-14 PROCEDURE — 82948 REAGENT STRIP/BLOOD GLUCOSE: CPT

## 2023-08-14 DEVICE — XIENCE SKYPOINT™ EVEROLIMUS ELUTING CORONARY STENT SYSTEM 2.50 MM X 12 MM / RAPID-EXCHANGE
Type: IMPLANTABLE DEVICE | Site: CHEST | Status: FUNCTIONAL
Brand: XIENCE SKYPOINT™

## 2023-08-14 RX ORDER — VERAPAMIL HYDROCHLORIDE 2.5 MG/ML
INJECTION, SOLUTION INTRAVENOUS CODE/TRAUMA/SEDATION MEDICATION
Status: DISCONTINUED | OUTPATIENT
Start: 2023-08-14 | End: 2023-08-14 | Stop reason: HOSPADM

## 2023-08-14 RX ORDER — PANTOPRAZOLE SODIUM 40 MG/1
40 TABLET, DELAYED RELEASE ORAL
Status: DISCONTINUED | OUTPATIENT
Start: 2023-08-14 | End: 2023-08-15 | Stop reason: HOSPADM

## 2023-08-14 RX ORDER — ONDANSETRON 2 MG/ML
4 INJECTION INTRAMUSCULAR; INTRAVENOUS EVERY 6 HOURS PRN
Status: DISCONTINUED | OUTPATIENT
Start: 2023-08-14 | End: 2023-08-15 | Stop reason: HOSPADM

## 2023-08-14 RX ORDER — SUCRALFATE 1 G/1
1 TABLET ORAL 4 TIMES DAILY
Status: DISCONTINUED | OUTPATIENT
Start: 2023-08-14 | End: 2023-08-15 | Stop reason: HOSPADM

## 2023-08-14 RX ORDER — ONDANSETRON 2 MG/ML
4 INJECTION INTRAMUSCULAR; INTRAVENOUS EVERY 6 HOURS PRN
Status: CANCELLED | OUTPATIENT
Start: 2023-08-14

## 2023-08-14 RX ORDER — NITROGLYCERIN 0.4 MG/1
0.4 TABLET SUBLINGUAL
Status: DISCONTINUED | OUTPATIENT
Start: 2023-08-14 | End: 2023-08-15 | Stop reason: HOSPADM

## 2023-08-14 RX ORDER — HEPARIN SODIUM 1000 [USP'U]/ML
INJECTION, SOLUTION INTRAVENOUS; SUBCUTANEOUS CODE/TRAUMA/SEDATION MEDICATION
Status: DISCONTINUED | OUTPATIENT
Start: 2023-08-14 | End: 2023-08-14 | Stop reason: HOSPADM

## 2023-08-14 RX ORDER — NITROGLYCERIN 20 MG/100ML
INJECTION INTRAVENOUS CODE/TRAUMA/SEDATION MEDICATION
Status: DISCONTINUED | OUTPATIENT
Start: 2023-08-14 | End: 2023-08-14 | Stop reason: HOSPADM

## 2023-08-14 RX ORDER — ATORVASTATIN CALCIUM 40 MG/1
40 TABLET, FILM COATED ORAL
Status: DISCONTINUED | OUTPATIENT
Start: 2023-08-14 | End: 2023-08-15 | Stop reason: HOSPADM

## 2023-08-14 RX ORDER — CLOPIDOGREL BISULFATE 75 MG/1
75 TABLET ORAL DAILY
Qty: 30 TABLET | Refills: 11 | Status: CANCELLED | OUTPATIENT
Start: 2023-08-16

## 2023-08-14 RX ORDER — NIFEDIPINE 30 MG/1
30 TABLET, EXTENDED RELEASE ORAL DAILY
Status: DISCONTINUED | OUTPATIENT
Start: 2023-08-14 | End: 2023-08-15 | Stop reason: HOSPADM

## 2023-08-14 RX ORDER — ROSUVASTATIN CALCIUM 10 MG/1
40 TABLET, COATED ORAL DAILY
Qty: 30 TABLET | Refills: 3 | Status: CANCELLED | OUTPATIENT
Start: 2023-08-14

## 2023-08-14 RX ORDER — CLOPIDOGREL BISULFATE 75 MG/1
600 TABLET ORAL ONCE
Status: CANCELLED | OUTPATIENT
Start: 2023-08-15

## 2023-08-14 RX ORDER — FLUTICASONE PROPIONATE 50 MCG
1 SPRAY, SUSPENSION (ML) NASAL DAILY
Status: DISCONTINUED | OUTPATIENT
Start: 2023-08-14 | End: 2023-08-15 | Stop reason: HOSPADM

## 2023-08-14 RX ORDER — LISINOPRIL 20 MG/1
20 TABLET ORAL DAILY
Status: DISCONTINUED | OUTPATIENT
Start: 2023-08-14 | End: 2023-08-15 | Stop reason: HOSPADM

## 2023-08-14 RX ORDER — FENTANYL CITRATE 50 UG/ML
INJECTION, SOLUTION INTRAMUSCULAR; INTRAVENOUS CODE/TRAUMA/SEDATION MEDICATION
Status: DISCONTINUED | OUTPATIENT
Start: 2023-08-14 | End: 2023-08-14 | Stop reason: HOSPADM

## 2023-08-14 RX ORDER — MIDAZOLAM HYDROCHLORIDE 2 MG/2ML
INJECTION, SOLUTION INTRAMUSCULAR; INTRAVENOUS CODE/TRAUMA/SEDATION MEDICATION
Status: DISCONTINUED | OUTPATIENT
Start: 2023-08-14 | End: 2023-08-14 | Stop reason: HOSPADM

## 2023-08-14 RX ORDER — LIDOCAINE HYDROCHLORIDE 10 MG/ML
INJECTION, SOLUTION EPIDURAL; INFILTRATION; INTRACAUDAL; PERINEURAL CODE/TRAUMA/SEDATION MEDICATION
Status: DISCONTINUED | OUTPATIENT
Start: 2023-08-14 | End: 2023-08-14 | Stop reason: HOSPADM

## 2023-08-14 RX ORDER — ASPIRIN 81 MG/1
TABLET, CHEWABLE ORAL
Status: DISPENSED
Start: 2023-08-14 | End: 2023-08-14

## 2023-08-14 RX ORDER — SODIUM CHLORIDE 9 MG/ML
125 INJECTION, SOLUTION INTRAVENOUS CONTINUOUS
Status: DISCONTINUED | OUTPATIENT
Start: 2023-08-14 | End: 2023-08-15 | Stop reason: HOSPADM

## 2023-08-14 RX ORDER — CARVEDILOL 25 MG/1
25 TABLET ORAL 2 TIMES DAILY WITH MEALS
Status: DISCONTINUED | OUTPATIENT
Start: 2023-08-14 | End: 2023-08-15 | Stop reason: HOSPADM

## 2023-08-14 RX ORDER — NITROGLYCERIN 0.4 MG/1
0.4 TABLET SUBLINGUAL
Status: CANCELLED | OUTPATIENT
Start: 2023-08-14

## 2023-08-14 RX ORDER — PRASUGREL 10 MG/1
TABLET, FILM COATED ORAL CODE/TRAUMA/SEDATION MEDICATION
Status: DISCONTINUED | OUTPATIENT
Start: 2023-08-14 | End: 2023-08-14 | Stop reason: HOSPADM

## 2023-08-14 RX ORDER — ACETAMINOPHEN 325 MG/1
650 TABLET ORAL EVERY 4 HOURS PRN
Status: DISCONTINUED | OUTPATIENT
Start: 2023-08-14 | End: 2023-08-15 | Stop reason: HOSPADM

## 2023-08-14 RX ORDER — AMITRIPTYLINE HYDROCHLORIDE 10 MG/1
10 TABLET, FILM COATED ORAL
Status: DISCONTINUED | OUTPATIENT
Start: 2023-08-14 | End: 2023-08-15 | Stop reason: HOSPADM

## 2023-08-14 RX ORDER — INSULIN LISPRO 100 [IU]/ML
1-5 INJECTION, SOLUTION INTRAVENOUS; SUBCUTANEOUS
Status: DISCONTINUED | OUTPATIENT
Start: 2023-08-14 | End: 2023-08-15 | Stop reason: HOSPADM

## 2023-08-14 RX ORDER — LORATADINE 10 MG/1
10 TABLET ORAL DAILY
Status: DISCONTINUED | OUTPATIENT
Start: 2023-08-15 | End: 2023-08-15 | Stop reason: HOSPADM

## 2023-08-14 RX ORDER — ASPIRIN 81 MG/1
81 TABLET, CHEWABLE ORAL DAILY
Status: CANCELLED | OUTPATIENT
Start: 2023-08-15

## 2023-08-14 RX ORDER — ACETAMINOPHEN 325 MG/1
650 TABLET ORAL EVERY 4 HOURS PRN
Status: CANCELLED | OUTPATIENT
Start: 2023-08-14

## 2023-08-14 RX ORDER — RANOLAZINE 500 MG/1
500 TABLET, EXTENDED RELEASE ORAL 2 TIMES DAILY
Status: DISCONTINUED | OUTPATIENT
Start: 2023-08-14 | End: 2023-08-15 | Stop reason: HOSPADM

## 2023-08-14 RX ORDER — ASPIRIN 81 MG/1
TABLET, CHEWABLE ORAL CODE/TRAUMA/SEDATION MEDICATION
Status: DISCONTINUED | OUTPATIENT
Start: 2023-08-14 | End: 2023-08-14 | Stop reason: HOSPADM

## 2023-08-14 RX ORDER — SODIUM CHLORIDE 9 MG/ML
125 INJECTION, SOLUTION INTRAVENOUS CONTINUOUS
Status: CANCELLED | OUTPATIENT
Start: 2023-08-14 | End: 2023-08-14

## 2023-08-14 RX ORDER — NIFEDIPINE 30 MG/1
30 TABLET, EXTENDED RELEASE ORAL DAILY
Qty: 30 TABLET | Refills: 5 | Status: SHIPPED | OUTPATIENT
Start: 2023-08-14

## 2023-08-14 RX ADMIN — PANTOPRAZOLE SODIUM 40 MG: 40 TABLET, DELAYED RELEASE ORAL at 16:24

## 2023-08-14 RX ADMIN — ACETAMINOPHEN 650 MG: 325 TABLET, FILM COATED ORAL at 23:41

## 2023-08-14 RX ADMIN — SODIUM CHLORIDE 125 ML/HR: 0.9 INJECTION, SOLUTION INTRAVENOUS at 16:25

## 2023-08-14 RX ADMIN — INSULIN LISPRO 1 UNITS: 100 INJECTION, SOLUTION INTRAVENOUS; SUBCUTANEOUS at 16:22

## 2023-08-14 RX ADMIN — CARVEDILOL 25 MG: 25 TABLET, FILM COATED ORAL at 16:24

## 2023-08-14 RX ADMIN — SODIUM CHLORIDE 125 ML/HR: 0.9 INJECTION, SOLUTION INTRAVENOUS at 23:43

## 2023-08-14 RX ADMIN — LISINOPRIL 20 MG: 20 TABLET ORAL at 14:28

## 2023-08-14 RX ADMIN — SUCRALFATE 1 G: 1 TABLET ORAL at 14:28

## 2023-08-14 RX ADMIN — AMITRIPTYLINE HYDROCHLORIDE 10 MG: 10 TABLET, FILM COATED ORAL at 21:00

## 2023-08-14 RX ADMIN — SUCRALFATE 1 G: 1 TABLET ORAL at 21:01

## 2023-08-14 RX ADMIN — NIFEDIPINE 30 MG: 30 TABLET, FILM COATED, EXTENDED RELEASE ORAL at 14:28

## 2023-08-14 RX ADMIN — ATORVASTATIN CALCIUM 40 MG: 40 TABLET, FILM COATED ORAL at 16:24

## 2023-08-14 RX ADMIN — SODIUM CHLORIDE 125 ML/HR: 0.9 INJECTION, SOLUTION INTRAVENOUS at 08:38

## 2023-08-14 RX ADMIN — RANOLAZINE 500 MG: 500 TABLET, FILM COATED, EXTENDED RELEASE ORAL at 21:00

## 2023-08-14 NOTE — DISCHARGE INSTR - AVS FIRST PAGE
1. Please see the post angioplasty discharge instructions. No heavy lifting, greater than 10 lbs. or strenuous activity for 5 days. Follow angioplasty discharge instructions. 2.Remove band aid tomorrow. Shower and wash area- wrist gently with soap and water- beginning tomorrow. Rinse and pat dry. Apply new water seal band aid. Repeat this process for 5 days. No powders, creams lotions or antibiotic ointments  for 5 days. No tub baths, hot tubs or swimming for 5 days. 3. Call [facility] Office ([phone]) if you develop a fever, redness or drainage at your wrist access site. 4. No driving for 2 days    5. Do not stop aspirin or Plavix (clopiogrel) any reason without a cardiologist’s consent, or the stent could block up and cause a heart attack. 6. Stent card and book.

## 2023-08-14 NOTE — PLAN OF CARE
Problem: CARDIOVASCULAR - ADULT  Goal: Maintains optimal cardiac output and hemodynamic stability  Description: INTERVENTIONS:  - Monitor I/O, vital signs and rhythm  - Monitor for S/S and trends of decreased cardiac output  - Administer and titrate ordered vasoactive medications to optimize hemodynamic stability  - Assess quality of pulses, skin color and temperature  - Assess for signs of decreased coronary artery perfusion  - Instruct patient to report change in severity of symptoms  8/14/2023 1520 by Chandler Agosto RN  Outcome: Progressing  8/14/2023 1520 by Chandler Agosto RN  Outcome: Progressing  Goal: Absence of cardiac dysrhythmias or at baseline rhythm  Description: INTERVENTIONS:  - Continuous cardiac monitoring, vital signs, obtain 12 lead EKG if ordered  - Administer antiarrhythmic and heart rate control medications as ordered  - Monitor electrolytes and administer replacement therapy as ordered  8/14/2023 1520 by Chandler Agosto RN  Outcome: Progressing  8/14/2023 1520 by Chandler Agosto RN  Outcome: Progressing

## 2023-08-14 NOTE — INTERVAL H&P NOTE
H&P reviewed. After examining the patient, I find no changed to the H&P since it had been written. Pt w/ worsening angina and abnormal Lexiscan suggesting anterior wall ischemia. BP (!) 171/80 (BP Location: Right arm)   Pulse 76   Temp (!) 97.4 °F (36.3 °C) (Temporal)   Resp 16   Ht 5' 8" (1.727 m)   Wt 85.3 kg (188 lb)   SpO2 95%   BMI 28.59 kg/m²      Patient re-evaluated.  Accept as history and physical.    Shonna Lai, DO/August 14, 2023/9:29 AM

## 2023-08-15 ENCOUNTER — TELEPHONE (OUTPATIENT)
Dept: CARDIOLOGY CLINIC | Facility: CLINIC | Age: 58
End: 2023-08-15

## 2023-08-15 VITALS
HEART RATE: 78 BPM | HEIGHT: 68 IN | SYSTOLIC BLOOD PRESSURE: 153 MMHG | TEMPERATURE: 98 F | DIASTOLIC BLOOD PRESSURE: 72 MMHG | BODY MASS INDEX: 28.49 KG/M2 | WEIGHT: 188 LBS | RESPIRATION RATE: 14 BRPM | OXYGEN SATURATION: 98 %

## 2023-08-15 DIAGNOSIS — Z95.5 S/P DRUG ELUTING CORONARY STENT PLACEMENT: ICD-10-CM

## 2023-08-15 DIAGNOSIS — K21.00 GASTROESOPHAGEAL REFLUX DISEASE WITH ESOPHAGITIS WITHOUT HEMORRHAGE: ICD-10-CM

## 2023-08-15 LAB
ANION GAP SERPL CALCULATED.3IONS-SCNC: 4 MMOL/L
ATRIAL RATE: 60 BPM
BUN SERPL-MCNC: 5 MG/DL (ref 5–25)
CALCIUM SERPL-MCNC: 8.6 MG/DL (ref 8.3–10.1)
CHLORIDE SERPL-SCNC: 114 MMOL/L (ref 96–108)
CO2 SERPL-SCNC: 24 MMOL/L (ref 21–32)
CREAT SERPL-MCNC: 0.7 MG/DL (ref 0.6–1.3)
ERYTHROCYTE [DISTWIDTH] IN BLOOD BY AUTOMATED COUNT: 13.5 % (ref 11.6–15.1)
GFR SERPL CREATININE-BSD FRML MDRD: 97 ML/MIN/1.73SQ M
GLUCOSE P FAST SERPL-MCNC: 233 MG/DL (ref 65–99)
GLUCOSE SERPL-MCNC: 157 MG/DL (ref 65–140)
GLUCOSE SERPL-MCNC: 213 MG/DL (ref 65–140)
GLUCOSE SERPL-MCNC: 233 MG/DL (ref 65–140)
HCT VFR BLD AUTO: 36.9 % (ref 34.8–46.1)
HGB BLD-MCNC: 12.7 G/DL (ref 11.5–15.4)
MCH RBC QN AUTO: 31.1 PG (ref 26.8–34.3)
MCHC RBC AUTO-ENTMCNC: 34.4 G/DL (ref 31.4–37.4)
MCV RBC AUTO: 90 FL (ref 82–98)
P AXIS: 53 DEGREES
PLATELET # BLD AUTO: 259 THOUSANDS/UL (ref 149–390)
PMV BLD AUTO: 11.5 FL (ref 8.9–12.7)
POTASSIUM SERPL-SCNC: 4 MMOL/L (ref 3.5–5.3)
PR INTERVAL: 186 MS
QRS AXIS: 45 DEGREES
QRSD INTERVAL: 86 MS
QT INTERVAL: 426 MS
QTC INTERVAL: 426 MS
RBC # BLD AUTO: 4.09 MILLION/UL (ref 3.81–5.12)
SODIUM SERPL-SCNC: 142 MMOL/L (ref 135–147)
T WAVE AXIS: 29 DEGREES
VENTRICULAR RATE: 60 BPM
WBC # BLD AUTO: 7.94 THOUSAND/UL (ref 4.31–10.16)

## 2023-08-15 PROCEDURE — 93010 ELECTROCARDIOGRAM REPORT: CPT | Performed by: INTERNAL MEDICINE

## 2023-08-15 PROCEDURE — 82948 REAGENT STRIP/BLOOD GLUCOSE: CPT

## 2023-08-15 PROCEDURE — 80048 BASIC METABOLIC PNL TOTAL CA: CPT | Performed by: INTERNAL MEDICINE

## 2023-08-15 PROCEDURE — NC001 PR NO CHARGE: Performed by: INTERNAL MEDICINE

## 2023-08-15 PROCEDURE — 85027 COMPLETE CBC AUTOMATED: CPT | Performed by: INTERNAL MEDICINE

## 2023-08-15 RX ORDER — ROSUVASTATIN CALCIUM 40 MG/1
40 TABLET, COATED ORAL DAILY
Qty: 30 TABLET | Refills: 4 | Status: SHIPPED | OUTPATIENT
Start: 2023-08-15

## 2023-08-15 RX ORDER — ASPIRIN 81 MG/1
81 TABLET, CHEWABLE ORAL DAILY
Status: DISCONTINUED | OUTPATIENT
Start: 2023-08-15 | End: 2023-08-15 | Stop reason: HOSPADM

## 2023-08-15 RX ORDER — CLOPIDOGREL BISULFATE 75 MG/1
75 TABLET ORAL DAILY
Status: DISCONTINUED | OUTPATIENT
Start: 2023-08-15 | End: 2023-08-15 | Stop reason: HOSPADM

## 2023-08-15 RX ORDER — PANTOPRAZOLE SODIUM 40 MG/1
40 TABLET, DELAYED RELEASE ORAL
Qty: 60 TABLET | Refills: 4 | Status: SHIPPED | OUTPATIENT
Start: 2023-08-15

## 2023-08-15 RX ORDER — ASPIRIN 81 MG/1
81 TABLET, CHEWABLE ORAL DAILY
Qty: 30 TABLET | Refills: 11 | Status: SHIPPED | OUTPATIENT
Start: 2023-08-15

## 2023-08-15 RX ORDER — CLOPIDOGREL BISULFATE 75 MG/1
75 TABLET ORAL DAILY
Qty: 30 TABLET | Refills: 11 | Status: SHIPPED | OUTPATIENT
Start: 2023-08-15

## 2023-08-15 RX ADMIN — ASPIRIN 81 MG CHEWABLE TABLET 81 MG: 81 TABLET CHEWABLE at 09:30

## 2023-08-15 RX ADMIN — INSULIN LISPRO 1 UNITS: 100 INJECTION, SOLUTION INTRAVENOUS; SUBCUTANEOUS at 06:02

## 2023-08-15 RX ADMIN — LISINOPRIL 20 MG: 20 TABLET ORAL at 09:32

## 2023-08-15 RX ADMIN — CLOPIDOGREL BISULFATE 75 MG: 75 TABLET ORAL at 09:32

## 2023-08-15 RX ADMIN — CARVEDILOL 25 MG: 25 TABLET, FILM COATED ORAL at 09:32

## 2023-08-15 RX ADMIN — SUCRALFATE 1 G: 1 TABLET ORAL at 09:31

## 2023-08-15 RX ADMIN — SODIUM CHLORIDE 125 ML/HR: 0.9 INJECTION, SOLUTION INTRAVENOUS at 06:05

## 2023-08-15 RX ADMIN — NIFEDIPINE 30 MG: 30 TABLET, FILM COATED, EXTENDED RELEASE ORAL at 09:32

## 2023-08-15 RX ADMIN — RANOLAZINE 500 MG: 500 TABLET, FILM COATED, EXTENDED RELEASE ORAL at 09:31

## 2023-08-15 RX ADMIN — LORATADINE 10 MG: 10 TABLET ORAL at 09:31

## 2023-08-15 RX ADMIN — PANTOPRAZOLE SODIUM 40 MG: 40 TABLET, DELAYED RELEASE ORAL at 06:04

## 2023-08-15 NOTE — DISCHARGE SUMMARY
Discharge Summary - Susan Mcbride 65 y.o. female MRN: [id number]    Unit/Bed#: [facility] 409-01 Encounter: [id number]    Admission Date: 8/14/2023   Discharge Date: 8/15/2023  Disposition: Home    Condition at Discharge: good     Deepak Sanchez MD    OP Cardiologist: Dr. Donna Ying    Interventional cardiologist: Karen Lara    Admitting Diagnosis:  Angina class III with abnormal Lexiscan testing anterior wall ischemia    Secondary Diagnoses:   Next hyperlipidemia History of MI in the past  Primary hypertension  Stenosis of the left subclavian artery  Diabetes type II    Discharge Diagnosis:  CAD that is post PCI and drug-eluting stent to second marginal (Xience Skypoint 2.5 x 12 mm) via right radial artery    /72 (BP Location: Right arm)   Pulse 78   Temp 98 °F (36.7 °C) (Oral)   Resp 14   Ht 5' 8" (1.727 m)   Wt 85.3 kg (188 lb)   SpO2 98%   BMI 28.59 kg/m²       Review of Systems   All other systems reviewed and are negative. Physical Exam  Constitutional:       Appearance: Normal appearance. HENT:      Head: Normocephalic. Mouth/Throat:      Mouth: Mucous membranes are moist.   Cardiovascular:      Rate and Rhythm: Normal rate. Pulses: Normal pulses. Heart sounds: Normal heart sounds. Pulmonary:      Effort: Pulmonary effort is normal.      Breath sounds: Normal breath sounds. Abdominal:      General: Bowel sounds are normal.      Palpations: Abdomen is soft. Musculoskeletal:      Right lower leg: No edema. Left lower leg: No edema. Skin:     General: Skin is warm and dry. Capillary Refill: Capillary refill takes 2 to 3 seconds. Neurological:      Mental Status: She is alert and oriented to person, place, and time. Psychiatric:         Behavior: Behavior normal.      Right Radial artery has  + 2 pulse with brisk capillary refill. Neurovascular intact to  Left  hand.         HPI and Hospital Course: 56-year-old female with history of coronary artery disease and MI approximately 30 years ago. She was seen in the office by Dr. Ru Ayoub for substernal chest discomfort that radiates to her left arm. This chest pain occurs with activity and also at rest.  Patient underwent a nuclear stress test which revealed decreased uptake in the anterior region. Patient was electively admitted for cardiac catheterization to identify her coronary anatomy and ischemic burden. Results of the catheter are as follows: The vessel is large and is angiographically normal.      Left Circumflex   The vessel is moderate in size and non-dominant. There is mild diffuse disease throughout the vessel. Second Obtuse Marginal Branch   2nd Mrg lesion is 90% stenosed. Culprit lesion. Culprit for abnormal stress. GABRIELE flow is 3. The lesion is non high-C and focal.      Right Coronary Artery   The vessel is large and dominant. There is mild diffuse disease throughout the vessel. The vessel originates from the left coronary sinus. Single-vessel coronary artery disease-second marginal lesion was 90% stenosed. 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 Cardiac rehab referral has been made. Patient will follow up in [facility] office in Sept.    Smoking cessation discussed with patient and daughter. Patient has tried smoking   smoking cessation classes, medications an d does not want to try any meds.      Current Facility-Administered Medications   Medication Dose Route Frequency   • acetaminophen (TYLENOL) tablet 650 mg  650 mg Oral Q4H PRN   • amitriptyline (ELAVIL) tablet 10 mg  10 mg Oral HS   • atorvastatin (LIPITOR) tablet 40 mg  40 mg Oral Daily With Dinner   • carvedilol (COREG) tablet 25 mg  25 mg Oral BID With Meals   • fluticasone (FLONASE) 50 mcg/act nasal spray 1 spray  1 spray Nasal Daily   • insulin lispro (HumaLOG) 100 units/mL subcutaneous injection 1-5 Units  1-5 Units Subcutaneous TID AC   • lisinopril (ZESTRIL) tablet 20 mg  20 mg Oral Daily   • loratadine (CLARITIN) tablet 10 mg  10 mg Oral Daily   • NIFEdipine (PROCARDIA XL) 24 hr tablet 30 mg  30 mg Oral Daily   • nitroglycerin (NITROSTAT) SL tablet 0.4 mg  0.4 mg Sublingual Q5 Min PRN   • nitroglycerin (NITROSTAT) SL tablet 0.4 mg  0.4 mg Sublingual Q5 Min PRN   • ondansetron (ZOFRAN) injection 4 mg  4 mg Intravenous Q6H PRN   • pantoprazole (PROTONIX) EC tablet 40 mg  40 mg Oral BID AC   • ranolazine (RANEXA) 12 hr tablet 500 mg  500 mg Oral BID   • sodium chloride 0.9 % infusion  125 mL/hr Intravenous Continuous   • sodium chloride 0.9 % infusion  125 mL/hr Intravenous Continuous   • sucralfate (CARAFATE) tablet 1 g  1 g Oral 4x Daily       Pertinent Labs/diagnostics:        Lab Ressults:  Recent Results (from the past 24 hour(s))   POCT activated clotting time    Collection Time: 08/14/23 10:12 AM   Result Value Ref Range    Activated Clotting Time, i-STAT 320 (H) 89 - 137 sec    Specimen Type ARTERIAL    ECG 12 lead    Collection Time: 08/14/23 10:44 AM   Result Value Ref Range    Ventricular Rate 62 BPM    Atrial Rate 62 BPM    NH Interval 196 ms    QRSD Interval 98 ms    QT Interval 420 ms    QTC Interval 426 ms    P Axis 58 degrees    QRS Axis 44 degrees    T Wave Axis 54 degrees   Fingerstick Glucose (POCT)    Collection Time: 08/14/23  3:46 PM   Result Value Ref Range    POC Glucose 217 (H) 65 - 140 mg/dl   Fingerstick Glucose (POCT)    Collection Time: 08/14/23  8:39 PM   Result Value Ref Range    POC Glucose 231 (H) 65 - 140 mg/dl   CBC    Collection Time: 08/15/23  5:03 AM   Result Value Ref Range    WBC 7.94 4.31 - 10.16 Thousand/uL    RBC 4.09 3.81 - 5.12 Million/uL    Hemoglobin 12.7 11.5 - 15.4 g/dL    Hematocrit 36.9 34.8 - 46.1 %    MCV 90 82 - 98 fL    MCH 31.1 26.8 - 34.3 pg    MCHC 34.4 31.4 - 37.4 g/dL    RDW 13.5 11.6 - 15.1 %    Platelets 894 691 - 390 Thousands/uL    MPV 11.5 8.9 - 12.7 fL   Basic metabolic panel    Collection Time: 08/15/23  5:03 AM   Result Value Ref Range    Sodium 142 135 - 147 mmol/L    Potassium 4.0 3.5 - 5.3 mmol/L    Chloride 114 (H) 96 - 108 mmol/L    CO2 24 21 - 32 mmol/L    ANION GAP 4 mmol/L    BUN 5 5 - 25 mg/dL    Creatinine 0.70 0.60 - 1.30 mg/dL    Glucose 233 (H) 65 - 140 mg/dL    Glucose, Fasting 233 (H) 65 - 99 mg/dL    Calcium 8.6 8.3 - 10.1 mg/dL    eGFR 97 ml/min/1.73sq m   Fingerstick Glucose (POCT)    Collection Time: 08/15/23  5:51 AM   Result Value Ref Range    POC Glucose 213 (H) 65 - 140 mg/dl       Lipid Profile:   No results found for: "CHOL"  Lab Results   Component Value Date    HDL 38 (L) 04/03/2023     Lab Results   Component Value Date    LDLCALC 193 (H) 04/03/2023     Lab Results   Component Value Date    TRIG 208 (H) 04/03/2023       Tele: NSR    Discharge instructions/Information to patient and family:   See after visit summary for information provided to patient and family. Provisions for Follow-Up Care:  See after visit summary for information related to follow-up care and any pertinent home health orders. Planned Readmission: No    Discharge Statement:  I spent 45 minutes minutes discharging the patient. This time was spent on the day of discharge. I had direct contact with the patient on the day of discharge. Additional documentation is required if more than 30 minutes were spent on discharge. ** Please Note: Fluency Direct Dictation voice to text software may have been used in the creation of this document.  **

## 2023-08-16 RX ORDER — PANTOPRAZOLE SODIUM 40 MG/1
40 TABLET, DELAYED RELEASE ORAL
Qty: 60 TABLET | Refills: 4 | OUTPATIENT
Start: 2023-08-16

## 2023-08-16 NOTE — TELEPHONE ENCOUNTER
Phone call attempt to return patient call. Recording states: The service you are attempting to use is restricted or unavailable. Please contact customer care assistance.

## 2023-08-17 ENCOUNTER — TELEPHONE (OUTPATIENT)
Dept: CARDIOLOGY CLINIC | Facility: CLINIC | Age: 58
End: 2023-08-17

## 2023-08-17 ENCOUNTER — APPOINTMENT (EMERGENCY)
Dept: RADIOLOGY | Facility: HOSPITAL | Age: 58
End: 2023-08-17
Payer: MEDICARE

## 2023-08-17 ENCOUNTER — HOSPITAL ENCOUNTER (EMERGENCY)
Facility: HOSPITAL | Age: 58
Discharge: HOME | End: 2023-08-17
Attending: INTERNAL MEDICINE
Payer: MEDICARE

## 2023-08-17 VITALS
SYSTOLIC BLOOD PRESSURE: 127 MMHG | HEART RATE: 81 BPM | RESPIRATION RATE: 15 BRPM | OXYGEN SATURATION: 93 % | WEIGHT: 189.5 LBS | DIASTOLIC BLOOD PRESSURE: 89 MMHG | BODY MASS INDEX: 28.81 KG/M2 | TEMPERATURE: 98.2 F

## 2023-08-17 DIAGNOSIS — R07.9 CHEST PAIN: Primary | ICD-10-CM

## 2023-08-17 DIAGNOSIS — I25.10 CAD (CORONARY ARTERY DISEASE): ICD-10-CM

## 2023-08-17 LAB
ANION GAP SERPL CALCULATED.3IONS-SCNC: 7 MMOL/L
ATRIAL RATE: 79 BPM
BASOPHILS # BLD AUTO: 0.02 THOUSANDS/ÂΜL (ref 0–0.1)
BASOPHILS NFR BLD AUTO: 0 % (ref 0–1)
BNP SERPL-MCNC: 16 PG/ML (ref 0–100)
BUN SERPL-MCNC: 6 MG/DL (ref 5–25)
CALCIUM SERPL-MCNC: 9.3 MG/DL (ref 8.4–10.2)
CARDIAC TROPONIN I PNL SERPL HS: 6 NG/L
CHLORIDE SERPL-SCNC: 103 MMOL/L (ref 96–108)
CO2 SERPL-SCNC: 25 MMOL/L (ref 21–32)
CREAT SERPL-MCNC: 0.8 MG/DL (ref 0.6–1.3)
D DIMER PPP FEU-MCNC: 0.55 UG/ML FEU
EOSINOPHIL # BLD AUTO: 0.11 THOUSAND/ÂΜL (ref 0–0.61)
EOSINOPHIL NFR BLD AUTO: 1 % (ref 0–6)
ERYTHROCYTE [DISTWIDTH] IN BLOOD BY AUTOMATED COUNT: 13.4 % (ref 11.6–15.1)
GFR SERPL CREATININE-BSD FRML MDRD: 82 ML/MIN/1.73SQ M
GLUCOSE SERPL-MCNC: 268 MG/DL (ref 65–140)
HCT VFR BLD AUTO: 42.5 % (ref 34.8–46.1)
HGB BLD-MCNC: 14.5 G/DL (ref 11.5–15.4)
IMM GRANULOCYTES # BLD AUTO: 0.02 THOUSAND/UL (ref 0–0.2)
IMM GRANULOCYTES NFR BLD AUTO: 0 % (ref 0–2)
LIPASE SERPL-CCNC: 32 U/L (ref 11–82)
LYMPHOCYTES # BLD AUTO: 3.66 THOUSANDS/ÂΜL (ref 0.6–4.47)
LYMPHOCYTES NFR BLD AUTO: 39 % (ref 14–44)
MCH RBC QN AUTO: 30.6 PG (ref 26.8–34.3)
MCHC RBC AUTO-ENTMCNC: 34.1 G/DL (ref 31.4–37.4)
MCV RBC AUTO: 90 FL (ref 82–98)
MONOCYTES # BLD AUTO: 0.53 THOUSAND/ÂΜL (ref 0.17–1.22)
MONOCYTES NFR BLD AUTO: 6 % (ref 4–12)
NEUTROPHILS # BLD AUTO: 5.04 THOUSANDS/ÂΜL (ref 1.85–7.62)
NEUTS SEG NFR BLD AUTO: 54 % (ref 43–75)
NRBC BLD AUTO-RTO: 0 /100 WBCS
P AXIS: 65 DEGREES
PLATELET # BLD AUTO: 301 THOUSANDS/UL (ref 149–390)
PMV BLD AUTO: 11.2 FL (ref 8.9–12.7)
POTASSIUM SERPL-SCNC: 3.9 MMOL/L (ref 3.5–5.3)
PR INTERVAL: 176 MS
QRS AXIS: 31 DEGREES
QRSD INTERVAL: 82 MS
QT INTERVAL: 356 MS
QTC INTERVAL: 408 MS
RBC # BLD AUTO: 4.74 MILLION/UL (ref 3.81–5.12)
SODIUM SERPL-SCNC: 135 MMOL/L (ref 135–147)
T WAVE AXIS: 55 DEGREES
VENTRICULAR RATE: 79 BPM
WBC # BLD AUTO: 9.38 THOUSAND/UL (ref 4.31–10.16)

## 2023-08-17 PROCEDURE — 80048 BASIC METABOLIC PNL TOTAL CA: CPT | Performed by: INTERNAL MEDICINE

## 2023-08-17 PROCEDURE — 84484 ASSAY OF TROPONIN QUANT: CPT | Performed by: INTERNAL MEDICINE

## 2023-08-17 PROCEDURE — 83880 ASSAY OF NATRIURETIC PEPTIDE: CPT | Performed by: INTERNAL MEDICINE

## 2023-08-17 PROCEDURE — 36415 COLL VENOUS BLD VENIPUNCTURE: CPT | Performed by: INTERNAL MEDICINE

## 2023-08-17 PROCEDURE — 83690 ASSAY OF LIPASE: CPT | Performed by: INTERNAL MEDICINE

## 2023-08-17 PROCEDURE — 99285 EMERGENCY DEPT VISIT HI MDM: CPT

## 2023-08-17 PROCEDURE — 93005 ELECTROCARDIOGRAM TRACING: CPT

## 2023-08-17 PROCEDURE — 85025 COMPLETE CBC W/AUTO DIFF WBC: CPT | Performed by: INTERNAL MEDICINE

## 2023-08-17 PROCEDURE — 93010 ELECTROCARDIOGRAM REPORT: CPT

## 2023-08-17 PROCEDURE — 85379 FIBRIN DEGRADATION QUANT: CPT | Performed by: INTERNAL MEDICINE

## 2023-08-17 PROCEDURE — 71045 X-RAY EXAM CHEST 1 VIEW: CPT

## 2023-08-17 PROCEDURE — 99291 CRITICAL CARE FIRST HOUR: CPT | Performed by: INTERNAL MEDICINE

## 2023-08-17 RX ORDER — SODIUM CHLORIDE 9 MG/ML
3 INJECTION INTRAVENOUS
Status: DISCONTINUED | OUTPATIENT
Start: 2023-08-17 | End: 2023-08-17 | Stop reason: HOSPADM

## 2023-08-17 NOTE — TELEPHONE ENCOUNTER
Called daughter and she needed someone to call pharmacy and let them know that she is no longer on Omeprazole and is taking Protonix instead for insurance purposes. Spoke with pharmacist and he is contacting insurance company to straighten out. He will call me back.

## 2023-08-17 NOTE — TELEPHONE ENCOUNTER
PC to [facility] pharmacy and talked to pharmacist about her taking Protonix instead of Omeprazole. Daughter states Omeprazole clashed with another medication she takes and therefore, Protonix was prescribed. Pharmacy contacting insurance to explain. He will keep me updated.

## 2023-08-17 NOTE — TELEPHONE ENCOUNTER
Patients daughter called, states she was just discharged after stent placement, patient having chest pain since yesterday and SOB, asking if this is normal.  Advised no, she should be evaluated in ER.

## 2023-08-17 NOTE — EMTALA/ACUTE CARE TRANSFER
[facility] EMERGENCY DEPARTMENT  [address] [place] [id number] [phone]  Dept: [phone]      EMTALA TRANSFER CONSENT    NAME María Barton 1965                              MRN [id number]    I have been informed of my rights regarding examination, treatment, and transfer   by Dr. Daquan Valdivia MD    Benefits: Specialized equipment and/or services available at the receiving facility (Include comment)________________________    Risks: Potential for delay in receiving treatment, Potential deterioration of medical condition, Loss of IV, Possible worsening of condition or death during transfer, Increased discomfort during transfer      Consent for Transfer:  I acknowledge that my medical condition has been evaluated and explained to me by the emergency department physician or other qualified medical person and/or my attending physician, who has recommended that I be transferred to the service of  Accepting Physician: Ronda at [address] [place] [address] [address] Name, [address] : [place]. The above potential benefits of such transfer, the potential risks associated with such transfer, and the probable risks of not being transferred have been explained to me, and I fully understand them. The doctor has explained that, in my case, the benefits of transfer outweigh the risks. I agree to be transferred. I authorize the performance of emergency medical procedures and treatments upon me in both transit and upon arrival at the receiving facility. Additionally, I authorize the release of any and all medical records to the receiving facility and request they be transported with me, if possible. I understand that the safest mode of transportation during a medical emergency is an ambulance and that the Hospital advocates the use of this mode of transport.  Risks of traveling to the receiving facility by car, including absence of medical control, life sustaining equipment, such as oxygen, and medical personnel has been explained to me and I fully understand them. (DOMINIK CORRECT BOX BELOW)  [  ]  I consent to the stated transfer and to be transported by ambulance/helicopter. [  ]  I consent to the stated transfer, but refuse transportation by ambulance and accept full responsibility for my transportation by car. I understand the risks of non-ambulance transfers and I exonerate the Hospital and its staff from any deterioration in my condition that results from this refusal.    X___________________________________________    DATE  08/17/23  TIME________  Signature of patient or legally responsible individual signing on patient behalf           RELATIONSHIP TO PATIENT_________________________          Provider Certification    NAME Kristel Watson 1965                              MRN [id number]    A medical screening exam was performed on the above named patient. Based on the examination:    Condition Necessitating Transfer The encounter diagnosis was Chest pain.     Patient Condition: The patient has been stabilized such that within reasonable medical probability, no material deterioration of the patient condition or the condition of the unborn child(anabela) is likely to result from the transfer    Reason for Transfer: Level of Care needed not available at this facility    Transfer Requirements: [address]   · Space available and qualified personnel available for treatment as acknowledged by    · Agreed to accept transfer and to provide appropriate medical treatment as acknowledged by       Ana  · Appropriate medical records of the examination and treatment of the patient are provided at the time of transfer   [address] _______  · Transfer will be performed by qualified personnel from    and appropriate transfer equipment as required, including the use of necessary and appropriate life support measures. Provider Certification: I have examined the patient and explained the following risks and benefits of being transferred/refusing transfer to the patient/family:         Based on these reasonable risks and benefits to the patient and/or the unborn child(anabela), and based upon the information available at the time of the patient’s examination, I certify that the medical benefits reasonably to be expected from the provision of appropriate medical treatments at another medical facility outweigh the increasing risks, if any, to the individual’s medical condition, and in the case of labor to the unborn child, from effecting the transfer.     X____________________________________________ DATE 08/17/23        TIME_______      ORIGINAL - SEND TO MEDICAL RECORDS   COPY - SEND WITH PATIENT DURING TRANSFER

## 2023-08-17 NOTE — ED PROVIDER NOTES
History  Chief Complaint   Patient presents with   • Chest Pain     Patient had a cardiac stent placed on Monday and now has been having stabbing chest pain since last night. Took her nitroglycerin last night         HPI  27-year-old woman with coronary artery disease status post PCI and drug-eluting stent to the second marginal POD#3, hypertension, hyperlipidemia diabetes type 2 presents to ED for evaluation of chest pain. Patient reports she initially had palpitations last night, then developed chest pain started last night around 1 AM.  The chest pain was sharp and stabbing. Chest pain finally resolved at 5:30 AM and she was able to sleep. She reports chest pain is associated with dyspnea. She did take nitroglycerin tablet x1 which helped with the pain. She reports associated nausea as well, no vomiting. Patient denies headache, visual changes, dizziness, fevers, chills, abdominal pain, diarrhea, melena, hematochezia, dysuria, new skin rashes or numbness or tingling of the extremities. Prior to Admission Medications   Prescriptions Last Dose Informant Patient Reported? Taking?    NIFEdipine (PROCARDIA XL) 30 mg 24 hr tablet   No No   Sig: TAKE 1 TABLET BY MOUTH EVERY DAY   amitriptyline (ELAVIL) 10 mg tablet   No No   Sig: Take 1 tablet (10 mg total) by mouth daily at bedtime   aspirin 81 mg chewable tablet   No No   Sig: Chew 1 tablet (81 mg total) daily   carvedilol (COREG) 25 mg tablet   No No   Sig: Take 1 tablet (25 mg total) by mouth 2 (two) times a day with meals   cetirizine (ZyrTEC) 10 mg tablet   No No   Sig: Take 1 tablet (10 mg total) by mouth daily   clopidogrel (PLAVIX) 75 mg tablet   No No   Sig: Take 1 tablet (75 mg total) by mouth daily   fluticasone (FLONASE) 50 mcg/act nasal spray   No No   Si spray into each nostril daily   lisinopril (ZESTRIL) 20 mg tablet   No No   Sig: Take 1 tablet (20 mg total) by mouth daily   metFORMIN (GLUCOPHAGE) 1000 MG tablet   No No   Sig: Take 1 tablet (1,000 mg total) by mouth daily with breakfast   nitroglycerin (NITROSTAT) 0.4 mg SL tablet   No No   Sig: Place 1 tablet (0.4 mg total) under the tongue every 5 (five) minutes as needed for chest pain   pantoprazole (PROTONIX) 40 mg tablet   No No   Sig: Take 1 tablet (40 mg total) by mouth 2 (two) times a day before meals   ranolazine (RANEXA) 500 mg 12 hr tablet   No No   Sig: Take 1 tablet (500 mg total) by mouth 2 (two) times a day   rosuvastatin (CRESTOR) 40 MG tablet   No No   Sig: Take 1 tablet (40 mg total) by mouth daily   sucralfate (CARAFATE) 1 g tablet   No No   Sig: Take 1 tablet (1 g total) by mouth 4 (four) times a day      Facility-Administered Medications: None       Past Medical History:   Diagnosis Date   • Abnormal Pap smear of cervix     1/2023 NILM pap/+ HPV18   • Coronary artery disease    • Depression    • Diabetes mellitus ([facility])    • GERD (gastroesophageal reflux disease)    • Hyperlipidemia    • Hypertension        Past Surgical History:   Procedure Laterality Date   • ABDOMINOPLASTY  2001   • AUGMENTATION BREAST Bilateral 2001   • BREAST BIOPSY Left 2016    benign   • CARDIAC CATHETERIZATION Left 8/14/2023    Procedure: Cardiac Coronary Angiogram;  Surgeon: Tanika Rodriguez MD;  Location: [facility]; Service: Cardiology   • CARDIAC CATHETERIZATION  8/14/2023    Procedure: Cardiac catheterization;  Surgeon: Tanika Rodriguez MD;  [facility]; Service: Cardiology   • CARDIAC CATHETERIZATION N/A 8/14/2023    Procedure: Cardiac pci;  Surgeon: Tanika Rodriguez MD;  [facility]; Service: Cardiology   • FOOT FRACTURE SURGERY Right 2013   • LIPOSUCTION TRUNK  2001   • TUBAL LIGATION  1997       Family History   Problem Relation Age of Onset   • Cancer Brother    • Breast cancer Neg Hx    • Colon cancer Neg Hx    • Ovarian cancer Neg Hx      I have reviewed and agree with the history as documented.     E-Cigarette/Vaping   • E-Cigarette Use Never User E-Cigarette/Vaping Substances   • Nicotine No    • THC No    • CBD No    • Flavoring No    • Other No    • Unknown No      Social History     Tobacco Use   • Smoking status: Every Day     Packs/day: 0.25     Years: 50.00     Total pack years: 12.50     Types: Cigarettes     Passive exposure: Current   • Smokeless tobacco: Never   • Tobacco comments:     Patient not ready to qquit. Smokes 3-4 cigarettes a day    Vaping Use   • Vaping Use: Never used   Substance Use Topics   • Alcohol use: Yes     Comment: couple times/year   • Drug use: Never       Review of Systems   All other systems reviewed and are negative. Physical Exam  Physical Exam   Constitutional:  Well developed, well nourished, no acute distress, non-toxic appearance    HEENT:  Conjunctiva normal. Oropharynx moist  Respiratory:  No respiratory distress, normal breath sounds  Cardiovascular:  Normal rate, normal rhythm, no murmurs  GI:  Soft, nondistended, normal bowel sounds, nontender  :  No costovertebral angle tenderness   Musculoskeletal:  No edema, no tenderness, no deformities.    Integument:  Well hydrated, no rash   Lymphatic:  No lymphadenopathy noted   Neurologic:  Alert & oriented, normal motor function, normal sensory function, no focal deficits noted   Psychiatric:  Speech and behavior appropriate       Vital Signs  ED Triage Vitals [08/17/23 1456]   Temperature Pulse Respirations Blood Pressure SpO2   98.2 °F (36.8 °C) 80 18 139/86 98 %      Temp Source Heart Rate Source Patient Position - Orthostatic VS BP Location FiO2 (%)   Tympanic Monitor Sitting Left arm --      Pain Score       --           Vitals:    08/17/23 1456 08/17/23 1548   BP: 139/86 127/89   Pulse: 80 81   Patient Position - Orthostatic VS: Sitting Lying         Visual Acuity      ED Medications  Medications   sodium chloride (PF) 0.9 % injection 3 mL (has no administration in time range)       Diagnostic Studies  Results Reviewed     Procedure Component Value Units Date/Time    HS Troponin I 4hr [[id number]]     Lab Status: No result Specimen: Blood     HS Troponin 0hr (reflex protocol) [[id number]]  (Normal) Collected: 08/17/23 1508    Lab Status: Final result Specimen: Blood from Arm, Right Updated: 08/17/23 1543     hs TnI 0hr 6 ng/L     HS Troponin I 2hr [[id number]]     Lab Status: No result Specimen: Blood     B-Type Natriuretic Peptide(BNP) [[id number]]  (Normal) Collected: 08/17/23 1508    Lab Status: Final result Specimen: Blood from Arm, Right Updated: 08/17/23 1541     BNP 16 pg/mL     D-dimer, quantitative [[id number]]  (Abnormal) Collected: 08/17/23 1508    Lab Status: Final result Specimen: Blood from Arm, Right Updated: 08/17/23 1536     D-Dimer, Quant 0.55 ug/ml FEU     Narrative: In the evaluation for possible pulmonary embolism, in the appropriate (Well's Score of 4 or less) patient, the age adjusted d-dimer cutoff for this patient can be calculated as:    Age x 0.01 (in ug/mL) for Age-adjusted D-dimer exclusion threshold for a patient over 50 years.     Basic metabolic panel [[id number]]  (Abnormal) Collected: 08/17/23 1508    Lab Status: Final result Specimen: Blood from Arm, Right Updated: 08/17/23 1534     Sodium 135 mmol/L      Potassium 3.9 mmol/L      Chloride 103 mmol/L      CO2 25 mmol/L      ANION GAP 7 mmol/L      BUN 6 mg/dL      Creatinine 0.80 mg/dL      Glucose 268 mg/dL      Calcium 9.3 mg/dL      eGFR 82 ml/min/1.73sq m     Narrative:      [facility] guidelines for Chronic Kidney Disease (CKD):   •  Stage 1 with normal or high GFR (GFR > 90 mL/min/1.73 square meters)  •  Stage 2 Mild CKD (GFR = 60-89 mL/min/1.73 square meters)  •  Stage 3A Moderate CKD (GFR = 45-59 mL/min/1.73 square meters)  •  Stage 3B Moderate CKD (GFR = 30-44 mL/min/1.73 square meters)  •  Stage 4 Severe CKD (GFR = 15-29 mL/min/1.73 square meters)  •  Stage 5 End Stage CKD (GFR <15 mL/min/1.73 square meters)  Note: GFR calculation is accurate only with a steady state creatinine    Lipase [[id number]]  (Normal) Collected: 08/17/23 1508    Lab Status: Final result Specimen: Blood from Arm, Right Updated: 08/17/23 1534     Lipase 32 u/L     CBC and differential [[id number]] Collected: 08/17/23 1508    Lab Status: Final result Specimen: Blood from Arm, Right Updated: 08/17/23 1517     WBC 9.38 Thousand/uL      RBC 4.74 Million/uL      Hemoglobin 14.5 g/dL      Hematocrit 42.5 %      MCV 90 fL      MCH 30.6 pg      MCHC 34.1 g/dL      RDW 13.4 %      MPV 11.2 fL      Platelets 163 Thousands/uL      nRBC 0 /100 WBCs      Neutrophils Relative 54 %      Immat GRANS % 0 %      Lymphocytes Relative 39 %      Monocytes Relative 6 %      Eosinophils Relative 1 %      Basophils Relative 0 %      Neutrophils Absolute 5.04 Thousands/µL      Immature Grans Absolute 0.02 Thousand/uL      Lymphocytes Absolute 3.66 Thousands/µL      Monocytes Absolute 0.53 Thousand/µL      Eosinophils Absolute 0.11 Thousand/µL      Basophils Absolute 0.02 Thousands/µL                  X-ray chest 1 view portable    (Results Pending)              Procedures  CriticalCare Time    Date/Time: 8/17/2023 3:05 PM    Performed by: Aretha Walker MD  Authorized by: Aretha Walker MD    Critical care provider statement:     Critical care time (minutes):  31    Critical care was necessary to treat or prevent imminent or life-threatening deterioration of the following conditions:  Cardiac failure    Critical care was time spent personally by me on the following activities:  Blood draw for specimens, obtaining history from patient or surrogate, development of treatment plan with patient or surrogate, discussions with consultants, evaluation of patient's response to treatment, examination of patient, interpretation of cardiac output measurements, ordering and performing treatments and interventions, ordering and review of laboratory studies, ordering and review of radiographic studies, re-evaluation of patient's condition and review of old charts    I assumed direction of critical care for this patient from another provider in my specialty: no               ED Course  ED Course as of 08/17/23 1709   Thu Aug 17, 2023   1459 EKG: NSR, no YUDELKA, no STD.    1541 D-Dimer, Quant(!): 0.55  Under age-adjusted dimer cutoff   1543 hs TnI 0hr: 6   1543 BNP: 16   1555 I discussed the patient with Dr. Juaquin Floyd, cardiologist, who recommended transfer to Cath Lab capable facility   [phone] Patient accepted at [facility] for admission   [id number] I was notified by nursing, patient no longer wants to be hospitalized. Despite long conversation with the patient and her daughter, patient refuses hospitalization. She states she is willing to see a cardiologist but only as an outpatient   [facility] insists on leaving against medical advice, despite my recommendation to remain for ongoing treatment. 1: Capacity: I have determined that the patient has capacity to make the decision to leave against medical advice based on the following:   A. Ability to express a choice: The patient is able to express his or her choice and communicate that choice. Rebeca Bunn to understand relevant information: The patient is able to verbalize their diagnosis, understand information about the purpose of treatment, remember the information, and show that he or she can be part of the decision-making process. Dina Rubi to appreciate the significance of the information and its consequences: The patient understands the consequences of treatment refusal and the risks and benefits of accepting or refusing treatment. D. Ability to manipulate information: The patient is able to engage in reasoning as it applies to making treatment decisions   2: Psychiatric Consultation: There is not an indication to call psychiatry consultation to determine capacity   3.  Alternative Treatment: I have discussed the recommended course of treatment and available alternatives. 4. Risks: I have discussed the specific risks of that patient refusing treatment   5. Follow-up Care: I have discussed the follow-up care and advised to see Dr. Donnetta Hashimoto immediately   6. ED Option: I have emphasized that the patient has the option to return to the ED               HEART Risk Score    Flowsheet Row Most Recent Value   Heart Score Risk Calculator    History 2 Filed at: 08/17/2023 1548   ECG 0 Filed at: 08/17/2023 1548   Age 1 Filed at: 08/17/2023 1548   Risk Factors 2 Filed at: 08/17/2023 1548   Troponin 0 Filed at: 08/17/2023 1548   HEART Score 5 Filed at: 08/17/2023 1548                                      Medical Decision Making  52-year-old woman with coronary artery disease status post PCI and drug-eluting stent to the second marginal POD#3, hypertension, hyperlipidemia diabetes type 2 presents to ED for evaluation of chest pain. Differential diagnosis includes reocclusion, artery dissection, cardiac ischemia, postoperative complication, cardiogenic shock, metabolic disturbance, CARTER. Patient was discussed with cardiology, who recommended transferring patient to cardiac cath capable facility. Patient was initially agreeable and patient was excepted by Dr. Aurelia Douglas at [facility]. Patient then changed her mind, she reports she is tired of hospitals, she does not believe she needs to be here despite us to this reviewing the risk, she is only agreeable to follow-up with her cardiologist as an outpatient and signed out [facility]      CAD (coronary artery disease): chronic illness or injury  Chest pain: acute illness or injury  Amount and/or Complexity of Data Reviewed  External Data Reviewed: labs, radiology, ECG and notes. Labs: ordered. Decision-making details documented in ED Course. Radiology: ordered. Risk  OTC drugs. Prescription drug management. Decision regarding hospitalization.   Diagnosis or treatment significantly limited by social determinants of health. Disposition  Final diagnoses:   Chest pain   CAD (coronary artery disease)     Time reflects when diagnosis was documented in both MDM as applicable and the Disposition within this note     Time User Action Codes Description Comment    8/17/2023  3:54 PM Teresa Aguilera Add [R07.9] Chest pain     8/17/2023  4:33 PM Teresa Aguilera Add [I25.10] CAD (coronary artery disease)       ED Disposition     ED Disposition   AMA    Condition   --    Date/Time   Thu Aug 17, 2023  4:34 PM    Comment   Date: 8/17/2023  Patient: Juliane Clemons  Admitted: 8/17/2023  2:46 PM  Attending Provider: Teresa Aguilera MD    Juliane Clemons or her authorized caregiver has made the decision for the patient to leave the emergency department against the advic e of her attending physician. She or her authorized caregiver has been informed and understands the inherent risks, including death, cardiac arrest, respiratory arrest, cardiac ischemia. She or her authorized caregiver has decided to accept the resp onsibility for this decision. Juliane Clemons and all necessary parties have been advised that she may return for further evaluation or treatment.  Her condition at time of discharge was critical.  Juliane Clemons had current vital signs as follows:   /89 (BP Location: Left arm)   Pulse 81   Temp 98.2 °F (36.8 °C) (Tympanic)   Resp 15   Wt 86 kg (189 lb 8 oz)            MD Documentation    Flowsheet Row Most Recent Value   Patient Condition The patient has been stabilized such that within reasonable medical probability, no material deterioration of the patient condition or the condition of the unborn child(anabela) is likely to result from the transfer   Reason for Transfer Level of Care needed not available at this facility   Benefits of Transfer Specialized equipment and/or services available at the receiving facility (Include comment)________________________   Risks of Transfer Potential for delay in receiving treatment, Potential deterioration of medical condition, Loss of IV, Possible worsening of condition or death during transfer, Increased discomfort during transfer   Accepting Physician [address] Name, Payton Franco   Sending MD Jayda Echols      RN Documentation    [address] Name, Payton Franco      Follow-up Information     Follow up With Specialties Details Why Contact Info    Salomon Covington MD Family Medicine Go in 1 day  [address]  [phone]            Discharge Medication List as of 8/17/2023  4:34 PM      CONTINUE these medications which have NOT CHANGED    Details   amitriptyline (ELAVIL) 10 mg tablet Take 1 tablet (10 mg total) by mouth daily at bedtime, Starting Tue 8/1/2023, Until Sat 9/30/2023, Normal      aspirin 81 mg chewable tablet Chew 1 tablet (81 mg total) daily, Starting Tue 8/15/2023, Normal      carvedilol (COREG) 25 mg tablet Take 1 tablet (25 mg total) by mouth 2 (two) times a day with meals, Starting Tue 4/11/2023, Normal      cetirizine (ZyrTEC) 10 mg tablet Take 1 tablet (10 mg total) by mouth daily, Starting Tue 4/11/2023, Normal      clopidogrel (PLAVIX) 75 mg tablet Take 1 tablet (75 mg total) by mouth daily, Starting Tue 8/15/2023, Normal      fluticasone (FLONASE) 50 mcg/act nasal spray 1 spray into each nostril daily, Starting Tue 4/11/2023, Normal      lisinopril (ZESTRIL) 20 mg tablet Take 1 tablet (20 mg total) by mouth daily, Starting Tue 4/11/2023, Normal      metFORMIN (GLUCOPHAGE) 1000 MG tablet Take 1 tablet (1,000 mg total) by mouth daily with breakfast, Starting Tue 4/11/2023, Normal      NIFEdipine (PROCARDIA XL) 30 mg 24 hr tablet TAKE 1 TABLET BY MOUTH EVERY DAY, Starting Mon 8/14/2023, Normal      nitroglycerin (NITROSTAT) 0.4 mg SL tablet Place 1 tablet (0.4 mg total) under the tongue every 5 (five) minutes as needed for chest pain, Starting Mon 6/26/2023, Normal      pantoprazole (PROTONIX) 40 mg tablet Take 1 tablet (40 mg total) by mouth 2 (two) times a day before meals, Starting Tue 8/15/2023, Normal      ranolazine (RANEXA) 500 mg 12 hr tablet Take 1 tablet (500 mg total) by mouth 2 (two) times a day, Starting Mon 6/26/2023, Normal      rosuvastatin (CRESTOR) 40 MG tablet Take 1 tablet (40 mg total) by mouth daily, Starting Tue 8/15/2023, Normal      sucralfate (CARAFATE) 1 g tablet Take 1 tablet (1 g total) by mouth 4 (four) times a day, Starting Mon 4/3/2023, Normal             No discharge procedures on file.     PDMP Review     None          ED Provider  Electronically Signed by           Chela Morin MD  08/17/23 7032

## 2023-08-17 NOTE — ED NOTES
Pt refusing to be transferred to [facility], wants to go home, Dr. Reena Dasilva and daughter trying to convince pt to be transferred.      Saurabh Gonzalez RN  08/17/23 9087

## 2023-08-21 DIAGNOSIS — K21.9 GASTROESOPHAGEAL REFLUX DISEASE, UNSPECIFIED WHETHER ESOPHAGITIS PRESENT: ICD-10-CM

## 2023-08-21 DIAGNOSIS — R13.19 OTHER DYSPHAGIA: ICD-10-CM

## 2023-08-21 RX ORDER — SUCRALFATE 1 G/1
1 TABLET ORAL 4 TIMES DAILY
Qty: 120 TABLET | Refills: 0 | Status: SHIPPED | OUTPATIENT
Start: 2023-08-21

## 2023-08-30 ENCOUNTER — VBI (OUTPATIENT)
Dept: ADMINISTRATIVE | Facility: OTHER | Age: 58
End: 2023-08-30

## 2023-09-18 NOTE — PROGRESS NOTES
Cardiology Office Follow Up  Zakia Sheffield  1965  [id number]    ASSESSMENT:  · Precordial chest pain   · Coronary artery disease   - with remote history of MI in the past.   - [facility] 8/4/23: Status post PCI and JEFFREY to the second marginal > Xience Skypoint 2.5 x 12 mm. - pharmacologic nuclear stress test 7/17/23: LV perfusion probably abnormal. Decreased radiotracer uptake in the anterior region on the supine stress images which improves on prone imaging however does not totally normalized to resting supine intensity. The possibility of ischemia in the anterior region is suggested/cannot be excluded. Stress EF 48%. - TTE 5/25/23: LVEF 50%, mild global hypokinesis, grade 1 diastolic dysfunction, mild AR. · Hypertension  · Hyperlipidemia  · Stenosis of the left subclavian artery  · Diabetes mellitus type II     PLAN:  · Continue dual antiplatelet therapy with aspirin 81 mg daily, Plavix 75 mg daily. · Currently on carvedilol 25 mg twice daily, lisinopril 20 mg daily, Procardia 30 mg daily, Ranexa 500 mg twice daily. · Continue statin therapy with rosuvastatin 40 mg daily. · Patient will be attending Cardiac rehabilitation. · Patient has a follow-up appointment with Dr. Marilyn Riojas on 11/1/23. · Given chest pain during office visit, patient has been advised to present to the emergency department for evaluation. Patient stated that the chest pain is worse than she had with her prior stent placement. Despite continued encouragement for patient to present to the emergency department, she was unsure about presentation. Staff or EMS able to facilitate transport to the emergency department, however patient stated that if she does decide to present for evaluation, she will take herself. Extensive education provided to the patient regarding recent stent placement as well as MI. Interval History/ HPI:   60-year-old female presents for follow-up post recent cardiac catheterization.      number [id number] utilized to speak with the patient. Patient states that she has been having intermittent chest pain since her prior discharge for the stent. She states that it has a sudden onset with stabbing pain within her chest. Patient states that this occurs almost daily at this point. It occurs in the morning and in the evening. She states that it can wake her from her sleep and can occur at any given moment. During conversation, patient with severe chest pain described as a stabbing sensation within her chest. She grabbed her chest and continued to state that she had pain and appeared to be visually uncomfortable and in distress. She stated that the pain is now worse than she has ever had before even prior to her stent placement. Given symptomatology, patient had been advised to present to the emergency department for evaluation. Patient is very concerned about being in the hospital and does not want to be there for many days. In addition to the above, patient feels as though her arms get very weak and tired when she exhibits chest pain. In addition, she exhibits palpitations and feels nauseous. She denies any syncope or pre-syncope. Patient has been taking nitro almost daily as well. Sometimes she takes it several times a day due to the pain. Vitals:  /70   Pulse 80   Ht 5' 8" (1.727 m)   Wt 85.7 kg (189 lb)   BMI 28.74 kg/m²     Past Medical History:   Diagnosis Date   • Abnormal Pap smear of cervix     1/2023 NILM pap/+ HPV18   • Coronary artery disease    • Depression    • Diabetes mellitus ([address])    • GERD (gastroesophageal reflux disease)    • Hyperlipidemia    • Hypertension      Social History     Socioeconomic History   • Marital status:       Spouse name: Not on file   • Number of children: 1   • Years of education: Not on file   • Highest education level: Some college, no degree   Occupational History   • Occupation: Works in a [email or web address]   Tobacco Use   • Smoking status: Every Day Packs/day: 0.25     Years: 50.00     Total pack years: 12.50     Types: Cigarettes     Passive exposure: Current   • Smokeless tobacco: Never   • Tobacco comments:     Patient not ready to qquit. Smokes 3-4 cigarettes a day    Vaping Use   • Vaping Use: Never used   Substance and Sexual Activity   • Alcohol use: Yes     Comment: couple times/year   • Drug use: Never   • Sexual activity: Not Currently     Partners: Male     Birth control/protection: Female Sterilization, Post-menopausal   Other Topics Concern   • Not on file   Social History Narrative   • Not on file     Social Determinants of Health     Financial Resource Strain: Low Risk  (1/24/2023)    Overall Financial Resource Strain (CARDIA)    • Difficulty of Paying Living Expenses: Not very hard   Food Insecurity: No Food Insecurity (1/24/2023)    Hunger Vital Sign    • Worried About Running Out of Food in the Last Year: Never true    • Ran Out of Food in the Last Year: Never true   Transportation Needs: No Transportation Needs (1/24/2023)    PRAPARE - Transportation    • Lack of Transportation (Medical): No    • Lack of Transportation (Non-Medical): No   Physical Activity: Not on file   Stress: Not on file   Social Connections: Not on file   Intimate Partner Violence: Not on file   Housing Stability: Not on file      Family History   Problem Relation Age of Onset   • Cancer Brother    • Breast cancer Neg Hx    • Colon cancer Neg Hx    • Ovarian cancer Neg Hx      Past Surgical History:   Procedure Laterality Date   • ABDOMINOPLASTY  2001   • AUGMENTATION BREAST Bilateral 2001   • BREAST BIOPSY Left 2016    benign   • CARDIAC CATHETERIZATION Left 8/14/2023    Procedure: Cardiac Coronary Angiogram;  Surgeon: Claudeen Lower, MD;  Location: [facility]; Service: Cardiology   • CARDIAC CATHETERIZATION  8/14/2023    Procedure: Cardiac catheterization;  Surgeon: Claudeen Lower, MD;  Location: [facility];   Service: Cardiology   • CARDIAC CATHETERIZATION N/A 8/14/2023    Procedure: Cardiac pci;  Surgeon: Vito Taveras MD;  Location: [facility];   Service: Cardiology   • FOOT FRACTURE SURGERY Right 2013   • LIPOSUCTION TRUNK  2001   • TUBAL LIGATION  1997       Current Outpatient Medications:   •  amitriptyline (ELAVIL) 10 mg tablet, Take 1 tablet (10 mg total) by mouth daily at bedtime, Disp: 60 tablet, Rfl: 0  •  aspirin 81 mg chewable tablet, Chew 1 tablet (81 mg total) daily, Disp: 30 tablet, Rfl: 11  •  carvedilol (COREG) 25 mg tablet, Take 1 tablet (25 mg total) by mouth 2 (two) times a day with meals, Disp: 60 tablet, Rfl: 5  •  cetirizine (ZyrTEC) 10 mg tablet, Take 1 tablet (10 mg total) by mouth daily, Disp: 30 tablet, Rfl: 1  •  clopidogrel (PLAVIX) 75 mg tablet, Take 1 tablet (75 mg total) by mouth daily, Disp: 30 tablet, Rfl: 11  •  fluticasone (FLONASE) 50 mcg/act nasal spray, 1 spray into each nostril daily, Disp: 16 g, Rfl: 1  •  lisinopril (ZESTRIL) 20 mg tablet, Take 1 tablet (20 mg total) by mouth daily, Disp: 30 tablet, Rfl: 5  •  metFORMIN (GLUCOPHAGE) 1000 MG tablet, Take 1 tablet (1,000 mg total) by mouth daily with breakfast, Disp: 90 tablet, Rfl: 1  •  NIFEdipine (PROCARDIA XL) 30 mg 24 hr tablet, TAKE 1 TABLET BY MOUTH EVERY DAY, Disp: 30 tablet, Rfl: 5  •  nitroglycerin (NITROSTAT) 0.4 mg SL tablet, Place 1 tablet (0.4 mg total) under the tongue every 5 (five) minutes as needed for chest pain, Disp: 25 tablet, Rfl: 2  •  pantoprazole (PROTONIX) 40 mg tablet, Take 1 tablet (40 mg total) by mouth 2 (two) times a day before meals, Disp: 60 tablet, Rfl: 4  •  ranolazine (RANEXA) 500 mg 12 hr tablet, Take 1 tablet (500 mg total) by mouth 2 (two) times a day, Disp: 60 tablet, Rfl: 5  •  rosuvastatin (CRESTOR) 40 MG tablet, Take 1 tablet (40 mg total) by mouth daily, Disp: 30 tablet, Rfl: 4  •  sucralfate (CARAFATE) 1 g tablet, TAKE 1 TABLET BY MOUTH FOUR TIMES A DAY, Disp: 120 tablet, Rfl: 0      Review of Systems:  Review of Systems   Constitutional: Negative for activity change and appetite change. Respiratory: Negative for shortness of breath. Cardiovascular: Positive for chest pain and palpitations. Negative for leg swelling. Gastrointestinal: Negative for abdominal distention and abdominal pain. Neurological: Positive for weakness. Negative for dizziness, syncope and light-headedness. Psychiatric/Behavioral: Negative for confusion. Physical Exam:  Physical Exam  Constitutional:       General: She is in acute distress. Cardiovascular:      Rate and Rhythm: Normal rate and regular rhythm. Pulses: Normal pulses. Pulmonary:      Effort: Pulmonary effort is normal. No respiratory distress. Abdominal:      General: Bowel sounds are normal. There is no distension. Musculoskeletal:         General: No swelling. Right lower leg: No edema. Left lower leg: No edema. Skin:     General: Skin is warm. Neurological:      General: No focal deficit present. Mental Status: She is alert. Mental status is at baseline. Psychiatric:         Mood and Affect: Mood normal.         Thought Content: Thought content normal.         This note was completed in part utilizing M-Modal Fluency Direct Software. Grammatical errors, random word insertions, spelling mistakes, and incomplete sentences can be an occasional consequence of this system secondary to software limitations, ambient noise, and hardware issues. If you have any questions or concerns about the content, text, or information contained within the body of this dictation, please contact the provider for clarification.

## 2023-09-20 ENCOUNTER — OFFICE VISIT (OUTPATIENT)
Dept: CARDIOLOGY CLINIC | Facility: CLINIC | Age: 58
End: 2023-09-20
Payer: MEDICARE

## 2023-09-20 VITALS
WEIGHT: 189 LBS | HEIGHT: 68 IN | SYSTOLIC BLOOD PRESSURE: 102 MMHG | BODY MASS INDEX: 28.64 KG/M2 | HEART RATE: 80 BPM | DIASTOLIC BLOOD PRESSURE: 70 MMHG

## 2023-09-20 DIAGNOSIS — Z95.5 S/P DRUG ELUTING CORONARY STENT PLACEMENT: Primary | ICD-10-CM

## 2023-09-20 PROCEDURE — 99214 OFFICE O/P EST MOD 30 MIN: CPT | Performed by: NURSE PRACTITIONER

## 2023-09-20 PROCEDURE — 93000 ELECTROCARDIOGRAM COMPLETE: CPT | Performed by: NURSE PRACTITIONER

## 2023-09-26 ENCOUNTER — TELEPHONE (OUTPATIENT)
Dept: FAMILY MEDICINE CLINIC | Facility: CLINIC | Age: 58
End: 2023-09-26

## 2023-09-26 ENCOUNTER — OFFICE VISIT (OUTPATIENT)
Dept: FAMILY MEDICINE CLINIC | Facility: CLINIC | Age: 58
End: 2023-09-26

## 2023-09-26 ENCOUNTER — APPOINTMENT (OUTPATIENT)
Dept: LAB | Facility: CLINIC | Age: 58
End: 2023-09-26
Payer: MEDICARE

## 2023-09-26 VITALS
BODY MASS INDEX: 28.95 KG/M2 | HEART RATE: 95 BPM | RESPIRATION RATE: 18 BRPM | WEIGHT: 191 LBS | TEMPERATURE: 97.8 F | DIASTOLIC BLOOD PRESSURE: 74 MMHG | HEIGHT: 68 IN | SYSTOLIC BLOOD PRESSURE: 118 MMHG | OXYGEN SATURATION: 98 %

## 2023-09-26 DIAGNOSIS — E78.5 HYPERLIPIDEMIA: ICD-10-CM

## 2023-09-26 DIAGNOSIS — Z11.4 SCREENING FOR HIV (HUMAN IMMUNODEFICIENCY VIRUS): ICD-10-CM

## 2023-09-26 DIAGNOSIS — F17.200 TOBACCO DEPENDENCE: ICD-10-CM

## 2023-09-26 DIAGNOSIS — G43.909 MIGRAINE WITHOUT STATUS MIGRAINOSUS, NOT INTRACTABLE, UNSPECIFIED MIGRAINE TYPE: ICD-10-CM

## 2023-09-26 DIAGNOSIS — M54.41 CHRONIC BILATERAL LOW BACK PAIN WITH BILATERAL SCIATICA: ICD-10-CM

## 2023-09-26 DIAGNOSIS — M54.42 CHRONIC BILATERAL LOW BACK PAIN WITH BILATERAL SCIATICA: ICD-10-CM

## 2023-09-26 DIAGNOSIS — Z11.59 NEED FOR HEPATITIS C SCREENING TEST: ICD-10-CM

## 2023-09-26 DIAGNOSIS — E11.9 TYPE 2 DIABETES MELLITUS WITHOUT COMPLICATION, WITHOUT LONG-TERM CURRENT USE OF INSULIN (HCC): ICD-10-CM

## 2023-09-26 DIAGNOSIS — F33.9 EPISODE OF RECURRENT MAJOR DEPRESSIVE DISORDER, UNSPECIFIED DEPRESSION EPISODE SEVERITY (HCC): ICD-10-CM

## 2023-09-26 DIAGNOSIS — E11.69 TYPE 2 DIABETES MELLITUS WITH OTHER SPECIFIED COMPLICATION, WITHOUT LONG-TERM CURRENT USE OF INSULIN (HCC): Primary | ICD-10-CM

## 2023-09-26 DIAGNOSIS — Z95.5 S/P DRUG ELUTING CORONARY STENT PLACEMENT: ICD-10-CM

## 2023-09-26 DIAGNOSIS — G89.29 CHRONIC BILATERAL LOW BACK PAIN WITH BILATERAL SCIATICA: ICD-10-CM

## 2023-09-26 DIAGNOSIS — J30.9 ALLERGIC RHINITIS, UNSPECIFIED SEASONALITY, UNSPECIFIED TRIGGER: ICD-10-CM

## 2023-09-26 DIAGNOSIS — I10 PRIMARY HYPERTENSION: ICD-10-CM

## 2023-09-26 LAB
ANION GAP SERPL CALCULATED.3IONS-SCNC: 9 MMOL/L
BUN SERPL-MCNC: 6 MG/DL (ref 5–25)
CALCIUM SERPL-MCNC: 9.4 MG/DL (ref 8.4–10.2)
CHLORIDE SERPL-SCNC: 102 MMOL/L (ref 96–108)
CO2 SERPL-SCNC: 26 MMOL/L (ref 21–32)
CREAT SERPL-MCNC: 0.89 MG/DL (ref 0.6–1.3)
CREAT UR-MCNC: 94 MG/DL
GFR SERPL CREATININE-BSD FRML MDRD: 72 ML/MIN/1.73SQ M
GLUCOSE P FAST SERPL-MCNC: 233 MG/DL (ref 65–99)
HCV AB SER QL: NORMAL
HIV 1+2 AB+HIV1 P24 AG SERPL QL IA: NORMAL
HIV 2 AB SERPL QL IA: NORMAL
HIV1 AB SERPL QL IA: NORMAL
HIV1 P24 AG SERPL QL IA: NORMAL
MICROALBUMIN UR-MCNC: 27.4 MG/L
MICROALBUMIN/CREAT 24H UR: 29 MG/G CREATININE (ref 0–30)
POTASSIUM SERPL-SCNC: 4.2 MMOL/L (ref 3.5–5.3)
SL AMB POCT HEMOGLOBIN AIC: 10.2 (ref ?–6.5)
SODIUM SERPL-SCNC: 137 MMOL/L (ref 135–147)

## 2023-09-26 PROCEDURE — 80048 BASIC METABOLIC PNL TOTAL CA: CPT

## 2023-09-26 PROCEDURE — 86803 HEPATITIS C AB TEST: CPT

## 2023-09-26 PROCEDURE — 83036 HEMOGLOBIN GLYCOSYLATED A1C: CPT | Performed by: FAMILY MEDICINE

## 2023-09-26 PROCEDURE — 99214 OFFICE O/P EST MOD 30 MIN: CPT | Performed by: FAMILY MEDICINE

## 2023-09-26 PROCEDURE — 82570 ASSAY OF URINE CREATININE: CPT

## 2023-09-26 PROCEDURE — 36415 COLL VENOUS BLD VENIPUNCTURE: CPT

## 2023-09-26 PROCEDURE — 82043 UR ALBUMIN QUANTITATIVE: CPT

## 2023-09-26 PROCEDURE — 87389 HIV-1 AG W/HIV-1&-2 AB AG IA: CPT

## 2023-09-26 RX ORDER — LISINOPRIL 20 MG/1
20 TABLET ORAL DAILY
Qty: 30 TABLET | Refills: 5 | Status: SHIPPED | OUTPATIENT
Start: 2023-09-26

## 2023-09-26 RX ORDER — AMITRIPTYLINE HYDROCHLORIDE 10 MG/1
10 TABLET, FILM COATED ORAL
Qty: 60 TABLET | Refills: 0 | Status: SHIPPED | OUTPATIENT
Start: 2023-09-26 | End: 2023-11-25

## 2023-09-26 RX ORDER — CETIRIZINE HYDROCHLORIDE 10 MG/1
10 TABLET ORAL DAILY
Qty: 30 TABLET | Refills: 1 | Status: SHIPPED | OUTPATIENT
Start: 2023-09-26

## 2023-09-26 RX ORDER — LIDOCAINE 50 MG/G
1 PATCH TOPICAL DAILY
Qty: 30 PATCH | Refills: 1 | Status: SHIPPED | OUTPATIENT
Start: 2023-09-26

## 2023-09-26 RX ORDER — ROSUVASTATIN CALCIUM 40 MG/1
40 TABLET, COATED ORAL DAILY
Qty: 30 TABLET | Refills: 4 | Status: SHIPPED | OUTPATIENT
Start: 2023-09-26

## 2023-09-26 NOTE — TELEPHONE ENCOUNTER
Pt daughter called in requesting status for a glucose monitor being sent to pharmacy. Per mother at the visit today pt was told a monitor will be sent. Please advise.

## 2023-09-26 NOTE — PROGRESS NOTES
Name: Vida Joiner      : 1965      MRN: [id number]  Encounter Provider: Rosa Guidry MD  Encounter Date: 2023   Encounter department: [facility],[address] [facility]     1. Type 2 diabetes mellitus with other specified complication, without long-term current use of insulin (HCC)  Assessment & Plan:    Lab Results   Component Value Date    HGBA1C 10.2 (A) 2023     Increased  - Current medications:  Metformin 1000 mg daily  Plan: increase Metformin 1000 mg bid and add Dapagliflozin 10 mg daily  - Keep log of blood glucose readings  - Encouraged: Diabetic Diet, Regular exercise, Weight loss   - +Htn: Lisinopril 20 mg  - +Hld: Zocor 10 mg  - Ophthalmology: 3 month ago  - DM Foot exam: completed today  - Dentist: pending    Orders:  -     dapagliflozin (Farxiga) 10 MG tablet; Take 1 tablet (10 mg total) by mouth daily  -     metFORMIN (GLUCOPHAGE) 1000 MG tablet; Take 1 tablet (1,000 mg total) by mouth daily with breakfast  -     POCT hemoglobin A1c  -     lisinopril (ZESTRIL) 20 mg tablet; Take 1 tablet (20 mg total) by mouth daily  -     Albumin / creatinine urine ratio; Future  -     Glucometer  -     Glucometer test strips  -     Lancets    2. Chronic bilateral low back pain with bilateral sciatica  -     lidocaine (Lidoderm) 5 %; Apply 1 patch topically over 12 hours daily Remove & Discard patch within 12 hours or as directed by MD    3. Episode of recurrent major depressive disorder, unspecified depression episode severity ([address])  Assessment & Plan:   26-year history of depression  Previously treated with unknown medication  Patient declines behavioral therapy at this time  She does not want to be started on any medication  Brief counseling given      4. Tobacco dependence  Assessment & Plan:  5 cigs per day  Advised on smoking cessation, pt declines at this time      5.  Allergic rhinitis, unspecified seasonality, unspecified trigger  - cetirizine (ZyrTEC) 10 mg tablet; Take 1 tablet (10 mg total) by mouth daily    6. Migraine without status migrainosus, not intractable, unspecified migraine type  -     amitriptyline (ELAVIL) 10 mg tablet; Take 1 tablet (10 mg total) by mouth daily at bedtime    7. Hyperlipidemia  -     rosuvastatin (CRESTOR) 40 MG tablet; Take 1 tablet (40 mg total) by mouth daily    8. Primary hypertension  -     lisinopril (ZESTRIL) 20 mg tablet; Take 1 tablet (20 mg total) by mouth daily    9. Screening for HIV (human immunodeficiency virus)  -     HIV 1/2 AG/AB w Reflex [facility] for 2 yr old and above; Future    10. Need for hepatitis C screening test  -     Hepatitis C Antibody; Future           Subjective      HPI   Iris Patrick is a 65 y.o. female  who presented to the office today to follow-up for her chronic conditions. Patient states that she is still having left sided chest pain that occasionally radiates to her left arm. She was advised to go to the emergency room but states she does not want to go because it increases her depression. She has a history of depression for which she was treated with medication and used to follow with a psychiatrist more than 20 years ago. She states she has not been able to sleep well ever since the death of her  7 years ago. Smokes about 5 cig per day  History of chronic lower back pain with radiation to both legs greater on the right than the left. Status post PCI and JEFFREY on 8/14/2023              The following portions of the patient's history were reviewed and updated as appropriate: allergies, current medications, past family history, past medical history, past social history, past surgical history and problem list.    Review of Systems   Constitutional: Negative for chills and fever. HENT: Negative for congestion, rhinorrhea and sore throat. Respiratory: Negative for cough and shortness of breath. Cardiovascular: Positive for chest pain.    Gastrointestinal: Negative for diarrhea, nausea and vomiting. Genitourinary: Negative for dysuria. Musculoskeletal: Negative for back pain. Skin: Negative for rash. Neurological: Positive for dizziness and headaches. Psychiatric/Behavioral: Positive for sleep disturbance.        Current Outpatient Medications on File Prior to Visit   Medication Sig   • aspirin 81 mg chewable tablet Chew 1 tablet (81 mg total) daily   • carvedilol (COREG) 25 mg tablet Take 1 tablet (25 mg total) by mouth 2 (two) times a day with meals   • clopidogrel (PLAVIX) 75 mg tablet Take 1 tablet (75 mg total) by mouth daily   • fluticasone (FLONASE) 50 mcg/act nasal spray 1 spray into each nostril daily   • NIFEdipine (PROCARDIA XL) 30 mg 24 hr tablet TAKE 1 TABLET BY MOUTH EVERY DAY   • nitroglycerin (NITROSTAT) 0.4 mg SL tablet Place 1 tablet (0.4 mg total) under the tongue every 5 (five) minutes as needed for chest pain   • pantoprazole (PROTONIX) 40 mg tablet Take 1 tablet (40 mg total) by mouth 2 (two) times a day before meals   • ranolazine (RANEXA) 500 mg 12 hr tablet Take 1 tablet (500 mg total) by mouth 2 (two) times a day   • sucralfate (CARAFATE) 1 g tablet TAKE 1 TABLET BY MOUTH FOUR TIMES A DAY   • [DISCONTINUED] amitriptyline (ELAVIL) 10 mg tablet Take 1 tablet (10 mg total) by mouth daily at bedtime   • [DISCONTINUED] cetirizine (ZyrTEC) 10 mg tablet Take 1 tablet (10 mg total) by mouth daily   • [DISCONTINUED] lisinopril (ZESTRIL) 20 mg tablet Take 1 tablet (20 mg total) by mouth daily   • [DISCONTINUED] metFORMIN (GLUCOPHAGE) 1000 MG tablet Take 1 tablet (1,000 mg total) by mouth daily with breakfast   • [DISCONTINUED] rosuvastatin (CRESTOR) 40 MG tablet Take 1 tablet (40 mg total) by mouth daily       Objective     /74 (BP Location: Right arm, Patient Position: Sitting, Cuff Size: Standard)   Pulse 95   Temp 97.8 °F (36.6 °C) (Temporal)   Resp 18   Ht 5' 8" (1.727 m)   Wt 86.6 kg (191 lb)   SpO2 98%   BMI 29.04 kg/m² Physical Exam  Vitals and nursing note reviewed. Constitutional:       Appearance: She is well-developed. HENT:      Head: Normocephalic and atraumatic. Right Ear: External ear normal.      Left Ear: External ear normal.      Nose: Nose normal.   Eyes:      Extraocular Movements: Extraocular movements intact. Conjunctiva/sclera: Conjunctivae normal.      Pupils: Pupils are equal, round, and reactive to light. Cardiovascular:      Rate and Rhythm: Normal rate and regular rhythm. Pulses: no weak pulses     Heart sounds: Murmur heard. Pulmonary:      Effort: Pulmonary effort is normal. No respiratory distress. Breath sounds: Normal breath sounds. Abdominal:      General: Bowel sounds are normal. There is no distension. Palpations: Abdomen is soft. Tenderness: There is no abdominal tenderness. Musculoskeletal:      Right lower leg: No edema. Left lower leg: No edema. Feet:      Right foot:      Skin integrity: No ulcer, skin breakdown, erythema, warmth, callus or dry skin. Left foot:      Skin integrity: No ulcer, skin breakdown, erythema, warmth, callus or dry skin. Comments: Right lateral malleolus surgical scar and decreased sensation in this area  Skin:     Capillary Refill: Capillary refill takes less than 2 seconds. Neurological:      General: No focal deficit present. Mental Status: She is alert. Psychiatric:         Mood and Affect: Mood normal.         Behavior: Behavior normal.     Patient's shoes and socks removed. Right Foot/Ankle   Right Foot Inspection  Skin Exam: skin normal and skin intact. No dry skin, no warmth, no callus, no erythema, no maceration, no abnormal color, no pre-ulcer, no ulcer and no callus. Toe Exam: ROM and strength within normal limits. Sensory   Proprioception: intact  Monofilament testing: intact    Left Foot/Ankle  Left Foot Inspection  Skin Exam: skin normal and skin intact.  No dry skin, no warmth, no erythema, no maceration, normal color, no pre-ulcer, no ulcer and no callus. Toe Exam: ROM and strength within normal limits.      Sensory   Proprioception: intact  Monofilament testing: intact    Assign Risk Category  No deformity present  No loss of protective sensation  No weak pulses  Risk: 0    Rosa Guidry MD

## 2023-09-26 NOTE — ASSESSMENT & PLAN NOTE
Lab Results   Component Value Date    HGBA1C 10.2 (A) 09/26/2023     Increased  - Current medications:  Metformin 1000 mg daily  Plan: increase Metformin 1000 mg bid and add Dapagliflozin 10 mg daily  - Keep log of blood glucose readings  - Encouraged: Diabetic Diet, Regular exercise, Weight loss   - +Htn: Lisinopril 20 mg  - +Hld: Zocor 10 mg  - Ophthalmology: 3 month ago  - DM Foot exam: completed today  - Dentist: pending

## 2023-09-26 NOTE — ASSESSMENT & PLAN NOTE
26-year history of depression  Previously treated with unknown medication  Patient declines behavioral therapy at this time  She does not want to be started on any medication  Brief counseling given

## 2023-10-02 ENCOUNTER — OFFICE VISIT (OUTPATIENT)
Dept: FAMILY MEDICINE CLINIC | Facility: CLINIC | Age: 58
End: 2023-10-02

## 2023-10-02 VITALS
DIASTOLIC BLOOD PRESSURE: 70 MMHG | OXYGEN SATURATION: 99 % | BODY MASS INDEX: 28.34 KG/M2 | RESPIRATION RATE: 16 BRPM | SYSTOLIC BLOOD PRESSURE: 124 MMHG | HEIGHT: 68 IN | WEIGHT: 187 LBS | TEMPERATURE: 98.7 F | HEART RATE: 80 BPM

## 2023-10-02 DIAGNOSIS — M54.9 RIGHT-SIDED BACK PAIN, UNSPECIFIED BACK LOCATION, UNSPECIFIED CHRONICITY: Primary | ICD-10-CM

## 2023-10-02 PROCEDURE — 99213 OFFICE O/P EST LOW 20 MIN: CPT | Performed by: FAMILY MEDICINE

## 2023-10-02 RX ORDER — GABAPENTIN 300 MG/1
300 CAPSULE ORAL 3 TIMES DAILY
Qty: 90 CAPSULE | Refills: 3 | Status: SHIPPED | OUTPATIENT
Start: 2023-10-02

## 2023-10-02 RX ORDER — LIDOCAINE 50 MG/G
1 PATCH TOPICAL DAILY
Qty: 30 PATCH | Refills: 0 | Status: SHIPPED | OUTPATIENT
Start: 2023-10-02

## 2023-10-02 NOTE — TELEPHONE ENCOUNTER
10/02/23    Hi Dr. Cristobal Clarke,    Pt daughter came into the office today stating that glucometer, test strips and lancets Script was not sent / received. Pharmacy informed daughter. Pt daughter also stated that Mom told her that her PCP needed to speak to her. Pt daughter did not know the reason of PCP wanting to talk to her. Any questions, please contact Pt Daughter.

## 2023-10-02 NOTE — PROGRESS NOTES
Assessment/Plan:    1. Right-sided back pain, unspecified back location, unspecified chronicity  Comments:  Unimpressive neurological exam.  Would benefit from some physical therapy. In the interim we will conservatively treat with medication and topical lidocaine. Orders:  -     gabapentin (Neurontin) 300 mg capsule; Take 1 capsule (300 mg total) by mouth 3 (three) times a day  -     diclofenac sodium (VOLTAREN) 50 mg EC tablet; Take 1 tablet (50 mg total) by mouth 2 (two) times a day  -     lidocaine (Lidoderm) 5 %; Apply 1 patch topically over 12 hours daily Remove & Discard patch within 12 hours or as directed by MD         Subjective:      Patient ID: Roldan Vu is a 65 y.o. female. Coming in with acute exacerbation of her back pain. She states that it occurred a couple days ago when she woke up in the morning. Patient denies any trauma to the area. Patient reports on her right side and it radiates to her leg. The following portions of the patient's history were reviewed and updated as appropriate: allergies, current medications, past family history, past medical history, past social history, past surgical history, and problem list.    Review of Systems   Constitutional: Negative for chills and fever. HENT: Negative for ear pain and sore throat. Eyes: Negative for pain and visual disturbance. Respiratory: Negative for cough and shortness of breath. Cardiovascular: Negative for chest pain and palpitations. Gastrointestinal: Negative for abdominal pain and vomiting. Genitourinary: Negative for dysuria and hematuria. Musculoskeletal: Positive for back pain (With sciatica). Negative for arthralgias. Skin: Negative for color change and rash. Neurological: Negative for seizures and syncope. All other systems reviewed and are negative.         Objective:      /70 (BP Location: Right arm, Patient Position: Sitting, Cuff Size: Standard)   Pulse 80   Temp 98.7 °F (37.1 °C) (Temporal)   Resp 16   Ht 5' 8" (1.727 m)   Wt 84.8 kg (187 lb)   SpO2 99%   BMI 28.43 kg/m²          Physical Exam  Constitutional:       General: She is not in acute distress. Appearance: Normal appearance. HENT:      Nose: No congestion or rhinorrhea. Eyes:      Extraocular Movements: Extraocular movements intact. Pupils: Pupils are equal, round, and reactive to light. Cardiovascular:      Rate and Rhythm: Normal rate and regular rhythm. Pulses: Normal pulses. Heart sounds: Normal heart sounds. No murmur heard. No gallop. Pulmonary:      Effort: Pulmonary effort is normal.      Breath sounds: Normal breath sounds. No wheezing, rhonchi or rales. Abdominal:      General: Bowel sounds are normal. There is no distension. Palpations: Abdomen is soft. There is no mass. Tenderness: There is no abdominal tenderness. Hernia: No hernia is present. Musculoskeletal:      Lumbar back: Spasms (Right side) and tenderness present. No deformity, signs of trauma or lacerations. Negative right straight leg raise test.   Skin:     General: Skin is warm. Coloration: Skin is not jaundiced. Findings: No bruising. Neurological:      General: No focal deficit present. Mental Status: She is alert and oriented to person, place, and time. Psychiatric:         Mood and Affect: Mood normal.         Behavior: Behavior normal.         Thought Content:  Thought content normal.           Michaela Humphreys DO   Family Medicine PGY-2  10/3/2023

## 2023-10-03 ENCOUNTER — TELEPHONE (OUTPATIENT)
Dept: CARDIAC REHAB | Facility: CLINIC | Age: 58
End: 2023-10-03

## 2023-10-03 NOTE — TELEPHONE ENCOUNTER
Hi,  Can you please help me obtain a glucometer for this patient. I sent the scripts to the pharmacy, but they pharmacy states did not receive them.   Thanks,  Dr. Austin Emmy

## (undated) DEVICE — Device: Brand: ASAHI SILVERWAY

## (undated) DEVICE — GLIDESHEATH SLENDER STAINLESS STEEL KIT: Brand: GLIDESHEATH SLENDER

## (undated) DEVICE — CATH GUIDE LAUNCHER 6FR EBU 3.5

## (undated) DEVICE — THE VASC BAND HEMOSTAT IS A COMPRESSION DEVICE TO ASSIST HEMOSTASIS OF ARTERIAL, VENOUS AND HEMODIALYSIS PERCUTANEOUS ACCESS SITES.: Brand: VASC BAND™ HEMOSTAT

## (undated) DEVICE — MINI TREK CORONARY DILATATION CATHETER 2.0 MM X 12 MM / RAPID-EXCHANGE: Brand: MINI TREK

## (undated) DEVICE — RUNTHROUGH NS EXTRA FLOPPY PTCA GUIDEWIRE: Brand: RUNTHROUGH

## (undated) DEVICE — RADIFOCUS OPTITORQUE ANGIOGRAPHIC CATHETER: Brand: OPTITORQUE

## (undated) DEVICE — DGW .035 FC J3MM 260CM TEF: Brand: EMERALD